# Patient Record
Sex: FEMALE | Race: BLACK OR AFRICAN AMERICAN | HISPANIC OR LATINO | Employment: UNEMPLOYED | URBAN - METROPOLITAN AREA
[De-identification: names, ages, dates, MRNs, and addresses within clinical notes are randomized per-mention and may not be internally consistent; named-entity substitution may affect disease eponyms.]

---

## 2023-08-08 ENCOUNTER — HOSPITAL ENCOUNTER (EMERGENCY)
Facility: CLINIC | Age: 23
Discharge: HOME | End: 2023-08-08
Attending: EMERGENCY MEDICINE | Admitting: EMERGENCY MEDICINE
Payer: COMMERCIAL

## 2023-08-08 VITALS
BODY MASS INDEX: 23.24 KG/M2 | RESPIRATION RATE: 18 BRPM | HEIGHT: 63 IN | DIASTOLIC BLOOD PRESSURE: 89 MMHG | WEIGHT: 131.17 LBS | TEMPERATURE: 97.4 F | OXYGEN SATURATION: 99 % | HEART RATE: 87 BPM | SYSTOLIC BLOOD PRESSURE: 130 MMHG

## 2023-08-08 DIAGNOSIS — M54.9 BACK PAIN, UNSPECIFIED BACK LOCATION, UNSPECIFIED BACK PAIN LATERALITY, UNSPECIFIED CHRONICITY: ICD-10-CM

## 2023-08-08 LAB
ALBUMIN UR-MCNC: NEGATIVE MG/DL
AMORPH CRY #/AREA URNS HPF: ABNORMAL /HPF
APPEARANCE UR: ABNORMAL
BILIRUB UR QL STRIP: NEGATIVE
COLOR UR AUTO: ABNORMAL
GLUCOSE UR STRIP-MCNC: NEGATIVE MG/DL
HCG UR QL: NEGATIVE
HGB UR QL STRIP: NEGATIVE
KETONES UR STRIP-MCNC: NEGATIVE MG/DL
LEUKOCYTE ESTERASE UR QL STRIP: NEGATIVE
NITRATE UR QL: NEGATIVE
PH UR STRIP: 6.5 [PH] (ref 5–7)
RBC URINE: <1 /HPF
SP GR UR STRIP: 1.02 (ref 1–1.03)
SQUAMOUS EPITHELIAL: 2 /HPF
UROBILINOGEN UR STRIP-MCNC: 2 MG/DL
WBC URINE: 2 /HPF

## 2023-08-08 PROCEDURE — 81025 URINE PREGNANCY TEST: CPT | Performed by: EMERGENCY MEDICINE

## 2023-08-08 PROCEDURE — 250N000013 HC RX MED GY IP 250 OP 250 PS 637: Performed by: EMERGENCY MEDICINE

## 2023-08-08 PROCEDURE — 81001 URINALYSIS AUTO W/SCOPE: CPT | Performed by: EMERGENCY MEDICINE

## 2023-08-08 PROCEDURE — 99284 EMERGENCY DEPT VISIT MOD MDM: CPT

## 2023-08-08 RX ORDER — CYCLOBENZAPRINE HCL 5 MG
5 TABLET ORAL 3 TIMES DAILY PRN
Qty: 15 TABLET | Refills: 0 | Status: SHIPPED | OUTPATIENT
Start: 2023-08-08

## 2023-08-08 RX ORDER — LIDOCAINE 4 G/G
1 PATCH TOPICAL EVERY 24 HOURS
Qty: 5 PATCH | Refills: 0 | Status: SHIPPED | OUTPATIENT
Start: 2023-08-08

## 2023-08-08 RX ORDER — CYCLOBENZAPRINE HCL 5 MG
5 TABLET ORAL ONCE
Status: COMPLETED | OUTPATIENT
Start: 2023-08-08 | End: 2023-08-08

## 2023-08-08 RX ORDER — ACETAMINOPHEN 500 MG
1000 TABLET ORAL ONCE
Status: COMPLETED | OUTPATIENT
Start: 2023-08-08 | End: 2023-08-08

## 2023-08-08 RX ORDER — LIDOCAINE 4 G/G
1 PATCH TOPICAL ONCE
Status: DISCONTINUED | OUTPATIENT
Start: 2023-08-08 | End: 2023-08-08 | Stop reason: HOSPADM

## 2023-08-08 RX ADMIN — LIDOCAINE PATCH 4% 1 PATCH: 40 PATCH TOPICAL at 02:28

## 2023-08-08 RX ADMIN — CYCLOBENZAPRINE HYDROCHLORIDE 5 MG: 5 TABLET, FILM COATED ORAL at 04:34

## 2023-08-08 RX ADMIN — ACETAMINOPHEN 1000 MG: 500 TABLET, FILM COATED ORAL at 02:29

## 2023-08-08 ASSESSMENT — ACTIVITIES OF DAILY LIVING (ADL)
ADLS_ACUITY_SCORE: 33
ADLS_ACUITY_SCORE: 33

## 2023-08-08 NOTE — ED TRIAGE NOTES
Pt arrives with lower back pain that got worse today. Pt states she has had back pain for a while but has been getting worse. Pt states no prior trauma or injuries to back. Pt states pain is more noticeable when laughing and coughing. ABCS intact and Aox4.     Triage Assessment       Row Name 08/08/23 0055       Triage Assessment (Adult)    Airway WDL WDL       Respiratory WDL    Respiratory WDL WDL       Cardiac WDL    Cardiac WDL WDL

## 2023-08-08 NOTE — Clinical Note
Beverly Pacheco was seen and treated in our emergency department on 8/8/2023.  She may return to work on 08/11/2023.       If you have any questions or concerns, please don't hesitate to call.      Joycelyn Wade, DO

## 2023-08-08 NOTE — ED PROVIDER NOTES
"    History     Chief Complaint:  Back Pain       HPI   Beverly Pacheco is a 23 year old female who presents with back pain that began two nights ago. The pain worsens with movement and the patient reports being unable to twist very far. She reports the pain being accompanied by lower abdominal discomfort and nausea.   She notes that the pain worsened a lot while she was at work; she notes a history of back pain though intensity tonight was greater prompting concern. She denies medications helping, fever, chills, emesis, dysuria, hematuria, vaginal bleeding/discharge, diarrhea, bladder/bowel incontinence, focal weakness or saddle paresthesias. No history cancer or IVDA.    Independent Historian:    The patient provided the history noted above.    Review of External Notes:  I reviewed the patient's office visit note from 03/30/23.      Medications:    Omnicef  Bentyl  Pepcid  Norco  Robaxin  Zofran  Bactrim    Past Medical History:    Anxiety  Depression  Left breast mass    Physical Exam   Patient Vitals for the past 24 hrs:   BP Temp Temp src Pulse Resp SpO2 Height Weight   08/08/23 0054 -- 97.4  F (36.3  C) Temporal -- -- -- -- --   08/08/23 0053 130/89 -- -- 87 18 99 % 1.6 m (5' 3\") 59.5 kg (131 lb 2.8 oz)      Physical Exam  Vitals reviewed.  General: The patient appears uncomfortable  Head:  The scalp, face, and head appear normal  Eyes:  The pupils are equal and round    Conjunctivae and sclerae are normal  ENT:    Nose normal. Moist mucous membranes  Neck:  Normal range of motion  CV:  Regular rate and underlying rhythm     Normal S1 and S2    DP/PT pulses 2+ bilaterally  Resp:  Lungs are clear    Non-labored breathing    No rales    No wheezing   GI:  Abdomen is soft, there is no rigidity    No distension    No rebound tenderness     No abdominal tenderness    No guarding; no CVA tenderness  MS:  Back:    The cervical and thoracic spine is non-tender in the midline    The lumbar spine is non-tender in " the midline    There is mild lumbar paraspinous muscular pain to palpation    Legs:    There is normal motor strength:     Iliopsoas, quadriceps, hamstrings, tibialis anterior, gastrocnemius, EHL    Sensation intact L2-S1 on bilateral lower extremities    Patellar reflexes are normal    There is normal capillary refill to the toes  Skin:  No rash or acute skin lesions noted.  No shingles noted.  Neuro:  Speech is normal and fluent    Awake and alert    Gait stable      Emergency Department Course     Laboratory:  Labs Ordered and Resulted from Time of ED Arrival to Time of ED Departure   ROUTINE UA WITH MICROSCOPIC - Abnormal       Result Value    Color Urine Light Yellow      Appearance Urine Slightly Cloudy (*)     Glucose Urine Negative      Bilirubin Urine Negative      Ketones Urine Negative      Specific Gravity Urine 1.018      Blood Urine Negative      pH Urine 6.5      Protein Albumin Urine Negative      Urobilinogen Urine 2.0      Nitrite Urine Negative      Leukocyte Esterase Urine Negative      Amorphous Crystals Urine Few (*)     RBC Urine <1      WBC Urine 2      Squamous Epithelials Urine 2 (*)    HCG QUALITATIVE URINE - Normal    hCG Urine Qualitative Negative          Emergency Department Course & Assessments:       Interventions:  Medications   Lidocaine (LIDOCARE) 4 % Patch 1 patch (1 patch Transdermal $Patch/Med Applied 8/8/23 0228)   acetaminophen (TYLENOL) tablet 1,000 mg (1,000 mg Oral $Given 8/8/23 0229)   cyclobenzaprine (FLEXERIL) tablet 5 mg (5 mg Oral $Given 8/8/23 0434)      Assessments:  0333 I obtained history and examined the patient as noted above.   0523 I rechecked and updated the patient. I deemed the patient safe to discharge home.    Independent Interpretation (X-rays, CTs, rhythm strip):  None    Consultations/Discussion of Management or Tests:  None       Social Determinants of Health affecting care:  Stress/Adjustment Disorders     Disposition:  The patient was discharged to  home.     Impression & Plan    CMS Diagnoses: None    Medical Decision Making:  Patient is a 23-year-old female presenting with predominately complaints of back pain.  She is nontoxic on arrival, in no significant distress.  I do not feel emergent blood work is warranted at this point in time.  UA obtained and without evidence to suggest underlying infection.  She is not pregnant today.  Clinically I doubt intra-abdominal source to explain her presentation.  Moreover I doubt pelvic etiology to explain her pain including but not limited to PID/ovarian torsion.  Stronger suspicion that pain is more musculoskeletal in nature particularly as it worsens with movement.  There is no clinical evidence to suggest cauda equina, discitis, transverse myelitis, epidural hematoma.  She is neurologically intact.  No red flag symptoms.  Patient did report some symptom improvement after analgesia provided.  She has allergies to ibuprofen unfortunately though I will initiate muscle relaxant in addition to lidocaine patches for symptom relief.  Avoid heavy bending/lifting/twisting.  Also provided spine referral on discharge as patient does report a longstanding history of back pain.  Gentle range of motion exercises discussed.  Return precautions given.    Diagnosis:    ICD-10-CM    1. Back pain, unspecified back location, unspecified back pain laterality, unspecified chronicity  M54.9            Discharge Medications:  Discharge Medication List as of 8/8/2023  5:24 AM        START taking these medications    Details   cyclobenzaprine (FLEXERIL) 5 MG tablet Take 1 tablet (5 mg) by mouth 3 times daily as needed for muscle spasms, Disp-15 tablet, R-0, Local Print      Lidocaine (LIDOCARE) 4 % Patch Place 1 patch onto the skin every 24 hours To prevent lidocaine toxicity, patient should be patch free for 12 hrs daily.Disp-5 patch, R-0Local Print            Scribe Disclosure:  Davian WERNER, am serving as a scribe at 3:20 AM on 8/8/2023  to document services personally performed by Joycelyn Wade,  based on my observations and the provider's statements to me.               Joycelyn Wade,   08/08/23 0573

## 2023-08-22 ENCOUNTER — OFFICE VISIT (OUTPATIENT)
Dept: NEUROSURGERY | Facility: CLINIC | Age: 23
End: 2023-08-22
Attending: PHYSICIAN ASSISTANT
Payer: COMMERCIAL

## 2023-08-22 VITALS — SYSTOLIC BLOOD PRESSURE: 117 MMHG | HEART RATE: 82 BPM | DIASTOLIC BLOOD PRESSURE: 74 MMHG | OXYGEN SATURATION: 95 %

## 2023-08-22 DIAGNOSIS — R52 GENERALIZED BODY ACHES: Primary | ICD-10-CM

## 2023-08-22 PROCEDURE — G0463 HOSPITAL OUTPT CLINIC VISIT: HCPCS | Performed by: PHYSICIAN ASSISTANT

## 2023-08-22 PROCEDURE — 99203 OFFICE O/P NEW LOW 30 MIN: CPT | Performed by: PHYSICIAN ASSISTANT

## 2023-08-22 ASSESSMENT — PAIN SCALES - GENERAL: PAINLEVEL: SEVERE PAIN (6)

## 2023-08-22 NOTE — LETTER
"    8/22/2023         RE: Beverly Pacheco  [address]  [facility] [address]        Dear Colleague,    Thank you for referring your patient, Beverly Pacheco, to the [facility] [place]. Please see a copy of my visit note below.    NEUROSURGERY CLINIC CONSULT NOTE     DATE OF VISIT: 8/22/2023     SUBJECTIVE:     Beverly Pacheco is a pleasant 23 year old female who presents to the clinic today for consultation on generalized, diffuse body aches that affect her from her shoulders to knees.     Today, she reports a multi-year history of symptoms with a two-week exacerbation. She describes a constant, sharp, aching, burning pain and tightness. This pain is not accompanied by paresthesia, numbness or perceived weakness but she does experience \"warmness\" on the lateral aspect of her legs. At times she has paresthesia in her feet with prolonged sitting. Prolonged walking, standing and sitting aggravate the symptoms, while alleviation is obtained by lying supine. No mechanism of injury such as trauma or a fall is associated with the onset of the pain, although she does state that it seems to be exacerbated at work. There are no bowel or bladder changes. She denies saddle anesthesia. She denies changes in gait, instability, or falling episodes. There has been no significant change in her handwriting or hand dexterity.   She has not participated in conservative therapies.          Current Outpatient Medications:      cyclobenzaprine (FLEXERIL) 5 MG tablet, Take 1 tablet (5 mg) by mouth 3 times daily as needed for muscle spasms, Disp: 15 tablet, Rfl: 0     Lidocaine (LIDOCARE) 4 % Patch, Place 1 patch onto the skin every 24 hours To prevent lidocaine toxicity, patient should be patch free for 12 hrs daily., Disp: 5 patch, Rfl: 0     Allergies   Allergen Reactions     Ibuprofen         No past medical history on file.     ROS: 10 point review of symptoms are negative other " than the symptoms noted above in the HPI.     Family History has been reviewed with the patient, there are no pertinent findings to presenting concern.     No past surgical history on file.           OBJECTIVE:   /74   Pulse 82   SpO2 95%    There is no height or weight on file to calculate BMI.     Imaging:     Normal lumbar x-rays dated 2022    Full radiological report in chart. Imaging was reviewed with with patient today.     Exam:     Patient appears comfortable, conversational, and in no apparent distress.   Head: Normocephalic, without obvious abnormality, atraumatic, no facial asymmetry.   Eyes: conjunctivae/corneas clear. PERRL, EOM's intact.   Throat: lips, mucosa, and tongue normal; teeth and gums normal.   Neck: supple, symmetrical, trachea midline, no adenopathy and thyroid: not enlarged, symmetric, no tenderness/mass/nodules.   Lungs: clear to auscultation bilaterally.   Heart: regular rate and rhythm.   Abdomen: soft, non-tender; bowel sounds normal; no masses, no organomegaly.   Pulses: 2+ and symmetric.   Skin: Skin color, texture, turgor normal. No rashes or lesions.     CN II-XII grossly intact, alert and appropriate with conversation and following commands.   Gait is non-antalgic. Able to tandem walk. Able to walk on toes and heels without difficulty.   Cervical spine is non tender to palpation. Appropriate range of motion of neck, not concerning for lhermitte's phenomenon.   Bilateral bicep 2/4 and tricep reflexes 1/4. Sensation intact throughout upper extremities.     UE muscle strength  Right  Left    Deltoid  5/5  5/5    Biceps  5/5  5/5    Triceps  5/5  5/5    Hand intrinsics  5/5  5/5    Hand grasp  5/5  5/5    Youssef signs  neg  neg      Lumbar spine is non tender to palpation.  Intact sensation throughout lower extremities.   Bilateral patellar 2/4 and achilles reflex 1/4. Negative for pain with straight leg raise.     LE muscle strength  Right  Left    Iliopsoas (hip flexion)   "5/5  5/5    Quad (knee extension)  5/5  5/5    Hamstring (knee flexion)  5/5  5/5    Gastrocnemius (PF)  5/5  5/5    Tibialis Ant. (DF)  5/5  5/5    EHL  5/5  5/5      Negative Babinski bilaterally. Negative for clonus.   Calves are soft and non-tender bilaterally.       ASSESSMENT/PLAN:     Beverly Pacheco is a 23 year old female who presents to the clinic for consultation on a multi-year history of symptoms with a two-week exacerbation. She describes a constant, sharp, aching, burning pain and tightness. This pain is not accompanied by paresthesia, numbness or perceived weakness but she does experience \"warmness\" on the lateral aspect of her legs. At times she has paresthesia in her feet with prolonged sitting. The patient's most recent imaging was reviewed with her today. It was explained that images do not show any concerning pathology. On exam, she is noted to have appropriate strength, sensation and range of motion. She has not attempted conservative management.    Based on her physical exam, imaging review, and lack of past treatments, we discussed options such as physical therapy, pain managemetn consult and neurology consult. She would like to start with Neurology to rule out any diagnosis such as MS, fibromyalgia, etc.    I do think physical therapy and pain management should be considered her next step.     We also discussed signs of a worsening problem that she should seek being evaluated.        Respectfully,     LARRY Marcum, LUIS ENRIQUE  [facility] Neurosurgery  [facility]  Tel: [phone]      Exam, imaging, and plan reviewed by Dr. Rodriguez.       Again, thank you for allowing me to participate in the care of your patient.        Sincerely,        Renzo Rodriguez PA-C  "

## 2023-08-22 NOTE — PROGRESS NOTES
"NEUROSURGERY CLINIC CONSULT NOTE     DATE OF VISIT: 8/22/2023     SUBJECTIVE:     Beverly Pacheco is a pleasant 23 year old female who presents to the clinic today for consultation on generalized, diffuse body aches that affect her from her shoulders to knees.     Today, she reports a multi-year history of symptoms with a two-week exacerbation. She describes a constant, sharp, aching, burning pain and tightness. This pain is not accompanied by paresthesia, numbness or perceived weakness but she does experience \"warmness\" on the lateral aspect of her legs. At times she has paresthesia in her feet with prolonged sitting. Prolonged walking, standing and sitting aggravate the symptoms, while alleviation is obtained by lying supine. No mechanism of injury such as trauma or a fall is associated with the onset of the pain, although she does state that it seems to be exacerbated at work. There are no bowel or bladder changes. She denies saddle anesthesia. She denies changes in gait, instability, or falling episodes. There has been no significant change in her handwriting or hand dexterity.   She has not participated in conservative therapies.          Current Outpatient Medications:     cyclobenzaprine (FLEXERIL) 5 MG tablet, Take 1 tablet (5 mg) by mouth 3 times daily as needed for muscle spasms, Disp: 15 tablet, Rfl: 0    Lidocaine (LIDOCARE) 4 % Patch, Place 1 patch onto the skin every 24 hours To prevent lidocaine toxicity, patient should be patch free for 12 hrs daily., Disp: 5 patch, Rfl: 0     Allergies   Allergen Reactions    Ibuprofen         No past medical history on file.     ROS: 10 point review of symptoms are negative other than the symptoms noted above in the HPI.     Family History has been reviewed with the patient, there are no pertinent findings to presenting concern.     No past surgical history on file.           OBJECTIVE:   /74   Pulse 82   SpO2 95%    There is no height or weight on file " to calculate BMI.     Imaging:     Normal lumbar x-rays dated 2022    Full radiological report in chart. Imaging was reviewed with with patient today.     Exam:     Patient appears comfortable, conversational, and in no apparent distress.   Head: Normocephalic, without obvious abnormality, atraumatic, no facial asymmetry.   Eyes: conjunctivae/corneas clear. PERRL, EOM's intact.   Throat: lips, mucosa, and tongue normal; teeth and gums normal.   Neck: supple, symmetrical, trachea midline, no adenopathy and thyroid: not enlarged, symmetric, no tenderness/mass/nodules.   Lungs: clear to auscultation bilaterally.   Heart: regular rate and rhythm.   Abdomen: soft, non-tender; bowel sounds normal; no masses, no organomegaly.   Pulses: 2+ and symmetric.   Skin: Skin color, texture, turgor normal. No rashes or lesions.     CN II-XII grossly intact, alert and appropriate with conversation and following commands.   Gait is non-antalgic. Able to tandem walk. Able to walk on toes and heels without difficulty.   Cervical spine is non tender to palpation. Appropriate range of motion of neck, not concerning for lhermitte's phenomenon.   Bilateral bicep 2/4 and tricep reflexes 1/4. Sensation intact throughout upper extremities.     UE muscle strength  Right  Left    Deltoid  5/5  5/5    Biceps  5/5  5/5    Triceps  5/5  5/5    Hand intrinsics  5/5  5/5    Hand grasp  5/5  5/5    Youssef signs  neg  neg      Lumbar spine is non tender to palpation.  Intact sensation throughout lower extremities.   Bilateral patellar 2/4 and achilles reflex 1/4. Negative for pain with straight leg raise.     LE muscle strength  Right  Left    Iliopsoas (hip flexion)  5/5  5/5    Quad (knee extension)  5/5  5/5    Hamstring (knee flexion)  5/5  5/5    Gastrocnemius (PF)  5/5  5/5    Tibialis Ant. (DF)  5/5  5/5    EHL  5/5  5/5      Negative Babinski bilaterally. Negative for clonus.   Calves are soft and non-tender bilaterally.       ASSESSMENT/PLAN:  "    Beverly Pacheco is a 23 year old female who presents to the clinic for consultation on a multi-year history of symptoms with a two-week exacerbation. She describes a constant, sharp, aching, burning pain and tightness. This pain is not accompanied by paresthesia, numbness or perceived weakness but she does experience \"warmness\" on the lateral aspect of her legs. At times she has paresthesia in her feet with prolonged sitting. The patient's most recent imaging was reviewed with her today. It was explained that images do not show any concerning pathology. On exam, she is noted to have appropriate strength, sensation and range of motion. She has not attempted conservative management.    Based on her physical exam, imaging review, and lack of past treatments, we discussed options such as physical therapy, pain managemetn consult and neurology consult. She would like to start with Neurology to rule out any diagnosis such as MS, fibromyalgia, etc.    I do think physical therapy and pain management should be considered her next step.     We also discussed signs of a worsening problem that she should seek being evaluated.        Respectfully,     LARRY Marcum, PA-JUAN  [facility] Neurosurgery  [facility]  Tel: [phone]      Exam, imaging, and plan reviewed by Dr. Rodriguez.   "

## 2023-08-22 NOTE — NURSING NOTE
"Beverly Pacheco is a 23 year old female who presents for:  Chief Complaint   Patient presents with    Hospital F/U     Back pain        Vitals:    Vitals:    08/22/23 1403   BP: 117/74   Pulse: 82   SpO2: 95%       BMI:  Estimated body mass index is 23.24 kg/m  as calculated from the following:    Height as of 8/8/23: 5' 3\" (1.6 m).    Weight as of 8/8/23: 131 lb 2.8 oz (59.5 kg).    Pain Score:  Severe Pain (6)        Amendo Phorn      "

## 2023-08-23 ENCOUNTER — TELEPHONE (OUTPATIENT)
Dept: NEUROLOGY | Facility: CLINIC | Age: 23
End: 2023-08-23

## 2023-08-23 NOTE — PROGRESS NOTES
INITIAL NEUROLOGY CONSULTATION    DATE OF VISIT: 8/24/2023  CLINIC LOCATION: [facility]  MRN: [id number]  PATIENT NAME: Beverly Pacheco  YOB: 2000    REASON FOR VISIT: No chief complaint on file.    HISTORY OF PRESENT ILLNESS:                                                    Ms. Beverly Pacheco is 23 year old right handed female patient with past medical history, who was seen today for diffuse body aches.    Per patient's report, symptoms started several years ago and usually occur with up to 2 weeks exacerbations every ***.  The pain is constant, sharp, aching and burning along with tightness.  No associated muscle weakness or paresthesias.  No other focal neurological symptoms, prior history of significant head injuries, seizures, or CNS infections.    Laboratory evaluation from August 2023 includes unremarkable urinalysis and hCG.    No additional useful information is available in Care Everywhere, which was reviewed.  PAST MEDICAL/SURGICAL HISTORY:                                                    I personally reviewed patient's past medical and surgical history with the patient at today's visit.  MEDICATIONS:                                                    I personally reviewed patient's medications and allergies with the patient at today's visit.  ALLERGIES:                                                      Allergies   Allergen Reactions    Ibuprofen      EXAM:                                                    VITAL SIGNS:   There were no vitals taken for this visit.  Mini-Cog Assessment:       General: pt is in NAD, cooperative.  Skin: normal turgor, moist mucous membranes, no lesions/rashes noticed.  HEENT: ATNC, EOMI, PERRL, white sclera, normal conjunctiva, no nystagmus or ptosis. No carotid bruits bilaterally.  Respiratory: lung sounds clear to auscultation bilaterally, no crackles, wheezes, rhonchi. Symmetric lung excursion, no accessory respiratory  muscle use.  Cardiovascular: normal S1/S2, no murmurs/rubs/gallops.   Abdomen: Not distended.  : deferred.    Neurological:  Mental: alert, follows commands,  /5 with ***/3 on memory recall, no aphasia or dysarthria. Fund of knowledge is {MYAPPROPRIATE:[id number]}  Cranial Nerves:  CN II: visual acuity - able to accurately count fingers with each eye. Visual fields intact, fundi: discs sharp, no papilledema and normal vessels bilaterally.  CN III, IV, VI: EOM intact, pupils equal and reactive  CN V: facial sensation nl  CN VII: face symmetric, no facial droop  CN VIII: hearing normal  CN IX: palate elevation symmetric, uvula at midline  CN XI SCM normal, shoulder shrug nl  CN XII: tongue midline  Motor: Strength: 5/5 in all major groups of all extremities. Normal tone. No abnormal movements. No pronator drift b/l.  Reflexes: Triceps, biceps, brachioradialis, patellar, and achilles reflexes normal and symmetric. No clonus noted. Toes are down-going b/l.   Sensory: light touch, pinprick, and vibration intact. Romberg: negative.  Coordination: FNF and heel-shin tests intact b/l.   Gait:  Normal, able to tandem walk *** without difficulty.  DATA:   LABS/EEG/IMAGING/OTHER STUDIES: I reviewed pertinent medical records, as detailed in the history of present illness.  ASSESSMENT AND PLAN:      ASSESSMENT: Beverly Pacheco is a 23 year old female patient with listed above past medical history, who presents with ***.    We had a detailed discussion with the patient regarding her presenting complaints.  The neurological exam today is ***.    DIAGNOSES:  No diagnosis found.  PLAN: There are no Patient Instructions on file for this visit.    Total Time: *** minutes spent on the date of the encounter doing chart review, history and exam, documentation and further activities per the note.    Zain Archuleta MD  [facility] Neurology  (Chart documentation was completed in part with Dragon voice-recognition software.  Even though reviewed, some grammatical, spelling, and word errors may remain.)

## 2023-08-24 ENCOUNTER — OFFICE VISIT (OUTPATIENT)
Dept: NEUROLOGY | Facility: CLINIC | Age: 23
End: 2023-08-24
Attending: PHYSICIAN ASSISTANT
Payer: COMMERCIAL

## 2023-08-24 VITALS — HEART RATE: 71 BPM | SYSTOLIC BLOOD PRESSURE: 110 MMHG | DIASTOLIC BLOOD PRESSURE: 67 MMHG | OXYGEN SATURATION: 98 %

## 2023-08-24 DIAGNOSIS — M79.601 PARESTHESIA AND PAIN OF BOTH UPPER EXTREMITIES: Primary | ICD-10-CM

## 2023-08-24 DIAGNOSIS — R20.2 PARESTHESIA AND PAIN OF BOTH UPPER EXTREMITIES: Primary | ICD-10-CM

## 2023-08-24 DIAGNOSIS — G89.29 CHRONIC BILATERAL LOW BACK PAIN WITHOUT SCIATICA: ICD-10-CM

## 2023-08-24 DIAGNOSIS — M54.2 CERVICALGIA: ICD-10-CM

## 2023-08-24 DIAGNOSIS — M79.602 PARESTHESIA AND PAIN OF BOTH UPPER EXTREMITIES: Primary | ICD-10-CM

## 2023-08-24 DIAGNOSIS — M54.9 UPPER BACK PAIN: ICD-10-CM

## 2023-08-24 DIAGNOSIS — M54.50 CHRONIC BILATERAL LOW BACK PAIN WITHOUT SCIATICA: ICD-10-CM

## 2023-08-24 PROCEDURE — 99205 OFFICE O/P NEW HI 60 MIN: CPT | Performed by: PSYCHIATRY & NEUROLOGY

## 2023-08-24 NOTE — PATIENT INSTRUCTIONS
AFTER VISIT SUMMARY (AVS):    At today's visit we thoroughly discussed various diagnostic possibilities for your symptoms, necessary evaluation, and the plan, which includes:  Orders Placed This Encounter   Procedures    Physical Therapy Referral    EMG     No new medications.     Additional recommendations after the work-up.    Next follow-up appointment is in the next 6-8 weeks or earlier if needed.    Please do not hesitate to call me with any questions or concerns.    Thanks.

## 2023-08-24 NOTE — PROGRESS NOTES
"INITIAL NEUROLOGY CONSULTATION    DATE OF VISIT: 8/24/2023  CLINIC LOCATION: [facility]  MRN: [id number]  PATIENT NAME: Beverly Pacheco  YOB: 2000    REASON FOR VISIT:   Chief Complaint   Patient presents with    Consult     Generalized muscle tightness ands pain      HISTORY OF PRESENT ILLNESS:                                                    Ms. Beverly Pacheco is 23 year old right handed female patient with past medical history of anxiety and depression, who was seen today for bilateral limb paresthesias and diffuse spine pain radiating to all extremities.    Per patient's report, symptoms started in 2017, mainly low back pain, but worsened over the last 2 to 3 weeks.  At the present time, she experiences worsening of her chronic back pain that spreads to her legs/thighs and neck/upper thoracic pain that spreads to her shoulders.  Both locations feel tight.  Hands and feet tend to fall asleep causing discomfort.  Feet paresthesia occurs after prolonged sitting or prolonged walking.  Upper extremity paresthesias, mainly over the ulnar surfaces, happen after overnight sleep.  No associated muscle weakness or other focal neurological symptoms.    Walking, standing, and lifting worsens her pain.  \"Cracking\" her back brings temporary relief from the pressure.  Applying heat or ice does not help.  Flexeril was marginally helpful.    No other focal neurological symptoms, prior history of significant head injuries, seizures, or CNS infections.  Family history is positive for seizures.    Laboratory evaluation from August 2023 includes unremarkable urinalysis and hCG.    No additional useful information is available in Care Everywhere, which was reviewed.  PAST MEDICAL/SURGICAL HISTORY:                                                    I personally reviewed patient's past medical and surgical history with the patient at today's visit.  MEDICATIONS:                              "                       I personally reviewed patient's medications and allergies with the patient at today's visit.  ALLERGIES:                                                      Allergies   Allergen Reactions    Ibuprofen      EXAM:                                                    VITAL SIGNS:   /67 (BP Location: Right arm, Patient Position: Sitting, Cuff Size: Adult Regular)   Pulse 71   SpO2 98%   Mini-Cog Assessment:  Mini Cog Assessment  Clock Draw Score: 2 Normal  3 Item Recall: 3 objects recalled  Mini Cog Total Score: 5  Administered by: : Shakira HERNANDEZ    General: pt is in NAD, cooperative.  Skin: normal turgor, moist mucous membranes, no lesions/rashes noticed.  HEENT: ATNC, EOMI, PERRL, white sclera, normal conjunctiva, no nystagmus or ptosis. No carotid bruits bilaterally.  Respiratory: lung sounds clear to auscultation bilaterally, no crackles, wheezes, rhonchi. Symmetric lung excursion, no accessory respiratory muscle use.  Cardiovascular: normal S1/S2, no murmurs/rubs/gallops.   Abdomen: Not distended.  : deferred.    Neurological:  Mental: alert, follows commands, Mini Cog Total Score: 5/5 with 3/3 on memory recall, no aphasia or dysarthria. Fund of knowledge is appropriate for age.  Cranial Nerves:  CN II: visual acuity - able to accurately count fingers with each eye. Visual fields intact, fundi: discs sharp, no papilledema and normal vessels bilaterally.  CN III, IV, VI: EOM intact, pupils equal and reactive  CN V: facial sensation nl  CN VII: face symmetric, no facial droop  CN VIII: hearing normal  CN IX: palate elevation symmetric, uvula at midline  CN XI SCM normal, shoulder shrug nl  CN XII: tongue midline  Motor: Strength: 5/5 in all major groups of all extremities. Normal tone. No abnormal movements. No pronator drift b/l.  Reflexes: Triceps, biceps, brachioradialis, patellar, and achilles reflexes normal and symmetric. No clonus noted. Toes are down-going b/l.   Sensory: light  touch/pinprick sensation is reduced on the ulnar surfaces of both hands and intact elsewhere, vibration intact. Romberg: negative.  Coordination: FNF and heel-shin tests intact b/l.   Gait:  Normal, able to tandem walk without difficulty.  DATA:   LABS/EEG/IMAGING/OTHER STUDIES: I reviewed pertinent medical records, as detailed in the history of present illness.  ASSESSMENT AND PLAN:      ASSESSMENT: Beverly Pacheco is a 23 year old female patient with listed above past medical history, who presents with diffuse spine pain, that radiates into her shoulders and thighs/lower extremities, along with limb paresthesias.    We had a detailed discussion with the patient regarding her presenting complaints.  The neurological exam today is noticeable for reduced pinprick sensation in the distribution of both ulnar nerves.  We discussed that the differential includes bilateral ulnar neuropathy versus cervical radiculopathy (less likely).  For diagnosis clarification, we decided to do EMG.    Regarding diffuse spine pain, I suspect musculoskeletal etiology.  I do not believe that we need to pursue spine imaging at this point, but I placed an order for physical therapy to see if it helps.  Might consider imaging if symptoms persist.    DIAGNOSES:    ICD-10-CM    1. Paresthesia and pain of both upper extremities  R20.2 Adult Neurology  Referral    M79.601 EMG    M79.602       2. Chronic bilateral low back pain without sciatica  M54.50 Physical Therapy Referral    G89.29       3. Cervicalgia  M54.2 Physical Therapy Referral      4. Upper back pain  M54.9 Physical Therapy Referral        PLAN: At today's visit we thoroughly discussed various diagnostic possibilities for patient's symptoms, necessary evaluation, and the plan, which includes:  Orders Placed This Encounter   Procedures    Physical Therapy Referral    EMG     No new medications.     Additional recommendations after the work-up.    Next follow-up  appointment is in the next 6-8 weeks or earlier if needed.    Total Time: 61 minutes spent on the date of the encounter doing chart review, history and exam, documentation and further activities per the note.    Zain Archuleta MD  [facility] Neurology  (Chart documentation was completed in part with Dragon voice-recognition software. Even though reviewed, some grammatical, spelling, and word errors may remain.)

## 2023-08-24 NOTE — LETTER
"    8/24/2023         RE: Beverly Pacheco  [address]  [facility] [address]        Dear Colleague,    Thank you for referring your patient, Beverly Pacheco, to the [facility] NEUROLOGY CLINICS [facility]. Please see a copy of my visit note below.    INITIAL NEUROLOGY CONSULTATION    DATE OF VISIT: 8/24/2023  CLINIC LOCATION: [facility]  MRN: [id number]  PATIENT NAME: Beverly Pacheco  YOB: 2000    REASON FOR VISIT:   Chief Complaint   Patient presents with     Consult     Generalized muscle tightness ands pain      HISTORY OF PRESENT ILLNESS:                                                    Ms. Beverly Pacheco is 23 year old right handed female patient with past medical history of anxiety and depression, who was seen today for bilateral limb paresthesias and diffuse spine pain radiating to all extremities.    Per patient's report, symptoms started in 2017, mainly low back pain, but worsened over the last 2 to 3 weeks.  At the present time, she experiences worsening of her chronic back pain that spreads to her legs/thighs and neck/upper thoracic pain that spreads to her shoulders.  Both locations feel tight.  Hands and feet tend to fall asleep causing discomfort.  Feet paresthesia occurs after prolonged sitting or prolonged walking.  Upper extremity paresthesias, mainly over the ulnar surfaces, happen after overnight sleep.  No associated muscle weakness or other focal neurological symptoms.    Walking, standing, and lifting worsens her pain.  \"Cracking\" her back brings temporary relief from the pressure.  Applying heat or ice does not help.  Flexeril was marginally helpful.    No other focal neurological symptoms, prior history of significant head injuries, seizures, or CNS infections.  Family history is positive for seizures.    Laboratory evaluation from August 2023 includes unremarkable urinalysis and hCG.    No additional useful information is " available in Care Everywhere, which was reviewed.  PAST MEDICAL/SURGICAL HISTORY:                                                    I personally reviewed patient's past medical and surgical history with the patient at today's visit.  MEDICATIONS:                                                    I personally reviewed patient's medications and allergies with the patient at today's visit.  ALLERGIES:                                                      Allergies   Allergen Reactions     Ibuprofen      EXAM:                                                    VITAL SIGNS:   /67 (BP Location: Right arm, Patient Position: Sitting, Cuff Size: Adult Regular)   Pulse 71   SpO2 98%   Mini-Cog Assessment:  Mini Cog Assessment  Clock Draw Score: 2 Normal  3 Item Recall: 3 objects recalled  Mini Cog Total Score: 5  Administered by: : Shakira HERNANDEZ    General: pt is in NAD, cooperative.  Skin: normal turgor, moist mucous membranes, no lesions/rashes noticed.  HEENT: ATNC, EOMI, PERRL, white sclera, normal conjunctiva, no nystagmus or ptosis. No carotid bruits bilaterally.  Respiratory: lung sounds clear to auscultation bilaterally, no crackles, wheezes, rhonchi. Symmetric lung excursion, no accessory respiratory muscle use.  Cardiovascular: normal S1/S2, no murmurs/rubs/gallops.   Abdomen: Not distended.  : deferred.    Neurological:  Mental: alert, follows commands, Mini Cog Total Score: 5/5 with 3/3 on memory recall, no aphasia or dysarthria. Fund of knowledge is appropriate for age.  Cranial Nerves:  CN II: visual acuity - able to accurately count fingers with each eye. Visual fields intact, fundi: discs sharp, no papilledema and normal vessels bilaterally.  CN III, IV, VI: EOM intact, pupils equal and reactive  CN V: facial sensation nl  CN VII: face symmetric, no facial droop  CN VIII: hearing normal  CN IX: palate elevation symmetric, uvula at midline  CN XI SCM normal, shoulder shrug nl  CN XII: tongue  midline  Motor: Strength: 5/5 in all major groups of all extremities. Normal tone. No abnormal movements. No pronator drift b/l.  Reflexes: Triceps, biceps, brachioradialis, patellar, and achilles reflexes normal and symmetric. No clonus noted. Toes are down-going b/l.   Sensory: light touch/pinprick sensation is reduced on the ulnar surfaces of both hands and intact elsewhere, vibration intact. Romberg: negative.  Coordination: FNF and heel-shin tests intact b/l.   Gait:  Normal, able to tandem walk without difficulty.  DATA:   LABS/EEG/IMAGING/OTHER STUDIES: I reviewed pertinent medical records, as detailed in the history of present illness.  ASSESSMENT AND PLAN:      ASSESSMENT: Beverly Pacheco is a 23 year old female patient with listed above past medical history, who presents with diffuse spine pain, that radiates into her shoulders and thighs/lower extremities, along with limb paresthesias.    We had a detailed discussion with the patient regarding her presenting complaints.  The neurological exam today is noticeable for reduced pinprick sensation in the distribution of both ulnar nerves.  We discussed that the differential includes bilateral ulnar neuropathy versus cervical radiculopathy (less likely).  For diagnosis clarification, we decided to do EMG.    Regarding diffuse spine pain, I suspect musculoskeletal etiology.  I do not believe that we need to pursue spine imaging at this point, but I placed an order for physical therapy to see if it helps.  Might consider imaging if symptoms persist.    DIAGNOSES:    ICD-10-CM    1. Paresthesia and pain of both upper extremities  R20.2 Adult Neurology  Referral    M79.601 EMG    M79.602       2. Chronic bilateral low back pain without sciatica  M54.50 Physical Therapy Referral    G89.29       3. Cervicalgia  M54.2 Physical Therapy Referral      4. Upper back pain  M54.9 Physical Therapy Referral        PLAN: At today's visit we thoroughly discussed  various diagnostic possibilities for patient's symptoms, necessary evaluation, and the plan, which includes:  Orders Placed This Encounter   Procedures     Physical Therapy Referral     EMG     No new medications.     Additional recommendations after the work-up.    Next follow-up appointment is in the next 6-8 weeks or earlier if needed.    Total Time: 61 minutes spent on the date of the encounter doing chart review, history and exam, documentation and further activities per the note.    Zain Archuleta MD  [facility] Neurology  (Chart documentation was completed in part with Dragon voice-recognition software. Even though reviewed, some grammatical, spelling, and word errors may remain.)          Again, thank you for allowing me to participate in the care of your patient.        Sincerely,        Zain Archuleta MD

## 2023-11-06 ENCOUNTER — TELEPHONE (OUTPATIENT)
Dept: NEUROLOGY | Facility: CLINIC | Age: 23
End: 2023-11-06

## 2023-11-07 ENCOUNTER — OFFICE VISIT (OUTPATIENT)
Dept: NEUROLOGY | Facility: CLINIC | Age: 23
End: 2023-11-07

## 2023-11-07 DIAGNOSIS — M79.601 PARESTHESIA AND PAIN OF BOTH UPPER EXTREMITIES: ICD-10-CM

## 2023-11-07 DIAGNOSIS — M79.602 PARESTHESIA AND PAIN OF BOTH UPPER EXTREMITIES: ICD-10-CM

## 2023-11-07 DIAGNOSIS — R20.2 PARESTHESIA AND PAIN OF BOTH UPPER EXTREMITIES: ICD-10-CM

## 2023-11-07 PROCEDURE — 95911 NRV CNDJ TEST 9-10 STUDIES: CPT | Performed by: PSYCHIATRY & NEUROLOGY

## 2023-11-07 PROCEDURE — 95886 MUSC TEST DONE W/N TEST COMP: CPT | Mod: 50 | Performed by: PSYCHIATRY & NEUROLOGY

## 2023-11-07 NOTE — LETTER
2023         RE: Beverly Pacheco  [address]  [facility] [address]        Dear Colleague,    Thank you for referring your patient, Beverly Pacheco, to the [facility] NEUROLOGY CLINICS [facility]. Please see a copy of my visit note below.                            [facility]  Electrodiagnostic Laboratory                 Department of Neurology                                                                                                         Test Date:  2023    Patient: Beverly Pacheco : 2000 Physician: Carroll Ye MD   Sex: Female AGE: 23 year Ref Phys: Zain Archuleta MD   ID#: [id number]   Technician: Kristy Behling     History and Examination:    23-year-old woman with chief complaint of chronic spine pain, stiffness and pain at her neck and shoulders, and intermittent paresthesias of both hands, mostly when asleep or when waking up in the morning.  Initially she reported more intense paresthesia at digits 4 and 5 of both hands, but at times it also affects the thenar and digits 1 or 2.  EMG was requested to evaluate for entrapment neuropathies of bilateral upper extremities, versus cervical radiculopathies.    Techniques:    Motor and sensory conduction studies were done with surface recording electrodes. EMG was done with a concentric needle electrode.     Results:    Bilateral median (APB), and ulnar (ADM) motor nerve conduction studies were normal.  Bilateral median, ulnar, and the left radial antidromic sensory nerve conduction studies were normal.  Bilateral median and ulnar orthodromic (palmar) mixed nerve conduction studies were normal. Left ulnar F wave latencies were normal.    Needle EMG of the right APB showed increased insertional activity but no sustained abnormal spontaneous activity.  MUP morphology and recruitment patterns were normal.  Needle EMG of bilateral FDI, EDC, triceps, deltoids, pronator teres, and the left  APB, were normal.    Interpretation:    Normal study.  No electrodiagnostic evidence of carpal tunnel syndrome, ulnar neuropathy, or cervical radiculopathy, on either side.    ___________________________  Carroll Ye MD        Nerve Conduction Studies  Motor Sites      Latency Amplitude Neg. Amp Diff Segment Distance Velocity Neg. Dur Neg Area Diff Temperature Comment   Site (ms) Norm (mV) Norm %  cm m/s Norm ms %  C    Left Median (APB) Motor   Wrist 3.0  < 4.4 12.1  > 5.0  Wrist-APB 8   6.0  -    Elbow 6.7 - 12.2  > 5.0 0.83 Elbow-Wrist 21 57  > 48 6.0 0.46 -    Right Median (APB) Motor   Wrist 3.5  < 4.4 12.7  > 5.0  Wrist-APB 8   5.4  -    Elbow 7.3 - 12.1  > 5.0 -4.7 Elbow-Wrist 21 55  > 48 5.4 -3.4 -    Left Ulnar (ADM) Motor   Wrist 2.9  < 3.5 11.1  > 5.0  Wrist-ADM 8   4.9  -    Bel Elbow 6.4 - 11.1 - 0 Bel Elbow-Wrist 19 54  > 48 4.9 -5.4 -    Abv Elbow 8.2 - 11.0 - -0.90 Abv Elbow-Bel Elbow 9 50  > 48 5.5 -3.7 -    Right Ulnar (ADM) Motor   Wrist 3.0  < 3.5 12.2  > 5.0  Wrist-ADM 8   5.8  -    Bel Elbow 6.7 - 10.7 - -12.3 Bel Elbow-Wrist 20 54  > 48 5.5 -6.3 -    Abv Elbow 8.5 - 10.9 - 1.87 Abv Elbow-Bel Elbow 9 50  > 48 6.3 -1.60 -      Sensory Sites      Onset Lat Peak Lat Amp (O-P) Amp (P-P) Segment Distance Velocity Temperature Comment   Site ms ms  V Norm  V  cm m/s Norm  C    Left Median Sensory   Wrist-Dig II 2.4 3.1 49  > 10 79 Wrist-Dig II 14 58  > 48 -    Right Median Sensory   Wrist-Dig II 2.1 2.9 78  > 10 80 Wrist-Dig II 14 67  > 48 -    Left Median-Ulnar Palmar Sensory        Median   Palm-Wrist 1.35 1.80 27 - 38 Palm-Wrist 8 59 - -         Ulnar   Palm-Wrist 1.48 2.0 28 - 34 Palm-Wrist 8 54 - -    Right Median-Ulnar Palmar Sensory        Median   Palm-Wrist 1.30 1.78 57 - 83 Palm-Wrist 8 62 - -         Ulnar   Palm-Wrist 1.35 1.90 30 - 39 Palm-Wrist 8 59 - -    Left Radial Sensory   Forearm-Wrist 1.65 2.2 56  > 15 80 Forearm-Wrist 10 61 - -    Left Ulnar Sensory   Wrist-Dig V 2.3  3.1 35  > 8 89 Wrist-Dig V 12.5 54  > 48 -    Right Ulnar Sensory   Wrist-Dig V 2.1 2.9 42  > 8 39 Wrist-Dig V 12.5 60  > 48 -      Inter-Nerve Comparisons     Nerve 1 Value 1 Nerve 2 Value 2 Parameter Result Normal   Sensory Sites   R Median Palm-Wrist 1.8 ms R Ulnar Palm-Wrist 1.9 ms Peak Lat Diff 0.12 ms <0.40   L Median Palm-Wrist 1.8 ms L Ulnar Palm-Wrist 2.0 ms Peak Lat Diff 0.20 ms <0.40     F Wave Studies     Min-F Max-F Dispersion Persistence Mean-F F-Norm L-R Mean-F L-R Mean-F Norm F/M Ratio F-M Lat (ms)   Left Ulnar (Abd Dig Min)   26.48 29.14 2.66 100.00 27.52 <36  <2.5 0.77 23.28       Electromyography     Side Muscle Ins Act Fibs/PSW Fasc HF Amp Dur Poly Recrt Int Pat   Right FDI Nml None Nml 0 Nml Nml 0 Nml Nml   Right Triceps Nml None Nml 0 Nml Nml 0 Nml Nml   Right Deltoid Nml None Nml 0 Nml Nml 0 Nml Nml   Right Pronator Teres Nml None Nml 0 Nml Nml 0 Nml Nml   Right EDC Nml None Nml 0 Nml Nml 0 Nml Nml   Right APB Incr None Nml 0 Nml Nml 0 Nml Nml   Left FDI Nml None Nml 0 Nml Nml 0 Nml Nml   Left EDC Nml None Nml 0 Nml Nml 0 Nml Nml   Left Triceps Nml None Nml 0 Nml Nml 0 Nml Nml   Left Deltoid Nml None Nml 0 Nml Nml 0 Nml Nml   Left Pronator Teres Nml None Nml 0 Nml Nml 0 Nml Nml   Left APB Nml None Nml 0 Nml Nml 0 Nml Nml         NCS Waveforms:    Motor                Sensory                            Ultrasound Images:                Again, thank you for allowing me to participate in the care of your patient.        Sincerely,        Craroll Ye MD

## 2023-11-07 NOTE — PROGRESS NOTES
[facility]  Electrodiagnostic Laboratory                 Department of Neurology                                                                                                         Test Date:  2023    Patient: Beverly Pacheco : 2000 Physician: Carroll Ye MD   Sex: Female AGE: 23 year Ref Phys: Zain Archuleta MD   ID#: [id number]   Technician: Kristy Behling     History and Examination:    23-year-old woman with chief complaint of chronic spine pain, stiffness and pain at her neck and shoulders, and intermittent paresthesias of both hands, mostly when asleep or when waking up in the morning.  Initially she reported more intense paresthesia at digits 4 and 5 of both hands, but at times it also affects the thenar and digits 1 or 2.  EMG was requested to evaluate for entrapment neuropathies of bilateral upper extremities, versus cervical radiculopathies.    Techniques:    Motor and sensory conduction studies were done with surface recording electrodes. EMG was done with a concentric needle electrode.     Results:    Bilateral median (APB), and ulnar (ADM) motor nerve conduction studies were normal.  Bilateral median, ulnar, and the left radial antidromic sensory nerve conduction studies were normal.  Bilateral median and ulnar orthodromic (palmar) mixed nerve conduction studies were normal. Left ulnar F wave latencies were normal.    Needle EMG of the right APB showed increased insertional activity but no sustained abnormal spontaneous activity.  MUP morphology and recruitment patterns were normal.  Needle EMG of bilateral FDI, EDC, triceps, deltoids, pronator teres, and the left APB, were normal.    Interpretation:    Normal study.  No electrodiagnostic evidence of carpal tunnel syndrome, ulnar neuropathy, or cervical radiculopathy, on either side.    ___________________________  Carroll Ye MD        Nerve Conduction Studies  Motor  Sites      Latency Amplitude Neg. Amp Diff Segment Distance Velocity Neg. Dur Neg Area Diff Temperature Comment   Site (ms) Norm (mV) Norm %  cm m/s Norm ms %  C    Left Median (APB) Motor   Wrist 3.0  < 4.4 12.1  > 5.0  Wrist-APB 8   6.0  -    Elbow 6.7 - 12.2  > 5.0 0.83 Elbow-Wrist 21 57  > 48 6.0 0.46 -    Right Median (APB) Motor   Wrist 3.5  < 4.4 12.7  > 5.0  Wrist-APB 8   5.4  -    Elbow 7.3 - 12.1  > 5.0 -4.7 Elbow-Wrist 21 55  > 48 5.4 -3.4 -    Left Ulnar (ADM) Motor   Wrist 2.9  < 3.5 11.1  > 5.0  Wrist-ADM 8   4.9  -    Bel Elbow 6.4 - 11.1 - 0 Bel Elbow-Wrist 19 54  > 48 4.9 -5.4 -    Abv Elbow 8.2 - 11.0 - -0.90 Abv Elbow-Bel Elbow 9 50  > 48 5.5 -3.7 -    Right Ulnar (ADM) Motor   Wrist 3.0  < 3.5 12.2  > 5.0  Wrist-ADM 8   5.8  -    Bel Elbow 6.7 - 10.7 - -12.3 Bel Elbow-Wrist 20 54  > 48 5.5 -6.3 -    Abv Elbow 8.5 - 10.9 - 1.87 Abv Elbow-Bel Elbow 9 50  > 48 6.3 -1.60 -      Sensory Sites      Onset Lat Peak Lat Amp (O-P) Amp (P-P) Segment Distance Velocity Temperature Comment   Site ms ms  V Norm  V  cm m/s Norm  C    Left Median Sensory   Wrist-Dig II 2.4 3.1 49  > 10 79 Wrist-Dig II 14 58  > 48 -    Right Median Sensory   Wrist-Dig II 2.1 2.9 78  > 10 80 Wrist-Dig II 14 67  > 48 -    Left Median-Ulnar Palmar Sensory        Median   Palm-Wrist 1.35 1.80 27 - 38 Palm-Wrist 8 59 - -         Ulnar   Palm-Wrist 1.48 2.0 28 - 34 Palm-Wrist 8 54 - -    Right Median-Ulnar Palmar Sensory        Median   Palm-Wrist 1.30 1.78 57 - 83 Palm-Wrist 8 62 - -         Ulnar   Palm-Wrist 1.35 1.90 30 - 39 Palm-Wrist 8 59 - -    Left Radial Sensory   Forearm-Wrist 1.65 2.2 56  > 15 80 Forearm-Wrist 10 61 - -    Left Ulnar Sensory   Wrist-Dig V 2.3 3.1 35  > 8 89 Wrist-Dig V 12.5 54  > 48 -    Right Ulnar Sensory   Wrist-Dig V 2.1 2.9 42  > 8 39 Wrist-Dig V 12.5 60  > 48 -      Inter-Nerve Comparisons     Nerve 1 Value 1 Nerve 2 Value 2 Parameter Result Normal   Sensory Sites   R Median Palm-Wrist 1.8 ms R Ulnar  Palm-Wrist 1.9 ms Peak Lat Diff 0.12 ms <0.40   L Median Palm-Wrist 1.8 ms L Ulnar Palm-Wrist 2.0 ms Peak Lat Diff 0.20 ms <0.40     F Wave Studies     Min-F Max-F Dispersion Persistence Mean-F F-Norm L-R Mean-F L-R Mean-F Norm F/M Ratio F-M Lat (ms)   Left Ulnar (Abd Dig Min)   26.48 29.14 2.66 100.00 27.52 <36  <2.5 0.77 23.28       Electromyography     Side Muscle Ins Act Fibs/PSW Fasc HF Amp Dur Poly Recrt Int Pat   Right FDI Nml None Nml 0 Nml Nml 0 Nml Nml   Right Triceps Nml None Nml 0 Nml Nml 0 Nml Nml   Right Deltoid Nml None Nml 0 Nml Nml 0 Nml Nml   Right Pronator Teres Nml None Nml 0 Nml Nml 0 Nml Nml   Right EDC Nml None Nml 0 Nml Nml 0 Nml Nml   Right APB Incr None Nml 0 Nml Nml 0 Nml Nml   Left FDI Nml None Nml 0 Nml Nml 0 Nml Nml   Left EDC Nml None Nml 0 Nml Nml 0 Nml Nml   Left Triceps Nml None Nml 0 Nml Nml 0 Nml Nml   Left Deltoid Nml None Nml 0 Nml Nml 0 Nml Nml   Left Pronator Teres Nml None Nml 0 Nml Nml 0 Nml Nml   Left APB Nml None Nml 0 Nml Nml 0 Nml Nml         NCS Waveforms:    Motor                Sensory                            Ultrasound Images:

## 2024-05-19 ENCOUNTER — HEALTH MAINTENANCE LETTER (OUTPATIENT)
Age: 24
End: 2024-05-19

## 2024-06-04 LAB
HEPATITIS B SURFACE ANTIGEN (EXTERNAL): NEGATIVE
HEPATITIS C ANTIBODY (EXTERNAL): NONREACTIVE
HIV1+2 AB SERPL QL IA: NONREACTIVE
RUBELLA ANTIBODY IGG (EXTERNAL): NORMAL

## 2024-09-04 LAB — TREPONEMA PALLIDUM ANTIBODY (EXTERNAL): NONREACTIVE

## 2024-10-02 ENCOUNTER — TRANSFERRED RECORDS (OUTPATIENT)
Dept: HEALTH INFORMATION MANAGEMENT | Facility: CLINIC | Age: 24
End: 2024-10-02
Payer: COMMERCIAL

## 2024-10-12 ENCOUNTER — TRANSFERRED RECORDS (OUTPATIENT)
Dept: HEALTH INFORMATION MANAGEMENT | Facility: CLINIC | Age: 24
End: 2024-10-12

## 2024-10-12 ENCOUNTER — HOSPITAL ENCOUNTER (INPATIENT)
Facility: CLINIC | Age: 24
LOS: 5 days | Discharge: HOME HEALTH | End: 2024-10-18
Attending: STUDENT IN AN ORGANIZED HEALTH CARE EDUCATION/TRAINING PROGRAM | Admitting: STUDENT IN AN ORGANIZED HEALTH CARE EDUCATION/TRAINING PROGRAM
Payer: COMMERCIAL

## 2024-10-12 DIAGNOSIS — O36.5931 POOR FETAL GROWTH AFFECTING MANAGEMENT OF MOTHER IN THIRD TRIMESTER, FETUS 1 OF MULTIPLE GESTATION: ICD-10-CM

## 2024-10-12 LAB
ABO/RH(D): NORMAL
ALBUMIN UR-MCNC: NEGATIVE MG/DL
ANTIBODY SCREEN: NEGATIVE
APPEARANCE UR: CLEAR
BACTERIA #/AREA URNS HPF: ABNORMAL /HPF
BASOPHILS # BLD AUTO: 0 10E3/UL (ref 0–0.2)
BASOPHILS NFR BLD AUTO: 0 %
BILIRUB UR QL STRIP: NEGATIVE
CLUE CELLS: ABNORMAL
COLOR UR AUTO: ABNORMAL
CRYSTALS AMN MICRO: NORMAL
EOSINOPHIL # BLD AUTO: 0.1 10E3/UL (ref 0–0.7)
EOSINOPHIL NFR BLD AUTO: 2 %
ERYTHROCYTE [DISTWIDTH] IN BLOOD BY AUTOMATED COUNT: 14 % (ref 10–15)
GLUCOSE UR STRIP-MCNC: NEGATIVE MG/DL
HCT VFR BLD AUTO: 32.2 % (ref 35–47)
HGB BLD-MCNC: 10.8 G/DL (ref 11.7–15.7)
HGB UR QL STRIP: NEGATIVE
IMM GRANULOCYTES # BLD: 0.1 10E3/UL
IMM GRANULOCYTES NFR BLD: 1 %
KETONES UR STRIP-MCNC: NEGATIVE MG/DL
LEUKOCYTE ESTERASE UR QL STRIP: ABNORMAL
LYMPHOCYTES # BLD AUTO: 0.8 10E3/UL (ref 0.8–5.3)
LYMPHOCYTES NFR BLD AUTO: 11 %
MCH RBC QN AUTO: 26.1 PG (ref 26.5–33)
MCHC RBC AUTO-ENTMCNC: 33.5 G/DL (ref 31.5–36.5)
MCV RBC AUTO: 78 FL (ref 78–100)
MONOCYTES # BLD AUTO: 0.9 10E3/UL (ref 0–1.3)
MONOCYTES NFR BLD AUTO: 12 %
NEUTROPHILS # BLD AUTO: 5.3 10E3/UL (ref 1.6–8.3)
NEUTROPHILS NFR BLD AUTO: 74 %
NITRATE UR QL: NEGATIVE
NRBC # BLD AUTO: 0 10E3/UL
NRBC BLD AUTO-RTO: 0 /100
PH UR STRIP: 6.5 [PH] (ref 5–7)
PLATELET # BLD AUTO: 122 10E3/UL (ref 150–450)
RBC # BLD AUTO: 4.14 10E6/UL (ref 3.8–5.2)
RBC URINE: <1 /HPF
SP GR UR STRIP: 1 (ref 1–1.03)
SPECIMEN EXPIRATION DATE: NORMAL
SQUAMOUS EPITHELIAL: 2 /HPF
TRICHOMONAS, WET PREP: ABNORMAL
UROBILINOGEN UR STRIP-MCNC: NORMAL MG/DL
WBC # BLD AUTO: 7.1 10E3/UL (ref 4–11)
WBC URINE: 2 /HPF
WBC'S/HIGH POWER FIELD, WET PREP: ABNORMAL
YEAST, WET PREP: PRESENT

## 2024-10-12 PROCEDURE — G0463 HOSPITAL OUTPT CLINIC VISIT: HCPCS

## 2024-10-12 PROCEDURE — 87491 CHLMYD TRACH DNA AMP PROBE: CPT | Performed by: STUDENT IN AN ORGANIZED HEALTH CARE EDUCATION/TRAINING PROGRAM

## 2024-10-12 PROCEDURE — 87653 STREP B DNA AMP PROBE: CPT | Performed by: STUDENT IN AN ORGANIZED HEALTH CARE EDUCATION/TRAINING PROGRAM

## 2024-10-12 PROCEDURE — 99231 SBSQ HOSP IP/OBS SF/LOW 25: CPT | Performed by: NURSE PRACTITIONER

## 2024-10-12 PROCEDURE — 81001 URINALYSIS AUTO W/SCOPE: CPT | Performed by: STUDENT IN AN ORGANIZED HEALTH CARE EDUCATION/TRAINING PROGRAM

## 2024-10-12 PROCEDURE — 86900 BLOOD TYPING SEROLOGIC ABO: CPT | Performed by: STUDENT IN AN ORGANIZED HEALTH CARE EDUCATION/TRAINING PROGRAM

## 2024-10-12 PROCEDURE — 250N000013 HC RX MED GY IP 250 OP 250 PS 637: Performed by: STUDENT IN AN ORGANIZED HEALTH CARE EDUCATION/TRAINING PROGRAM

## 2024-10-12 PROCEDURE — 85025 COMPLETE CBC W/AUTO DIFF WBC: CPT | Performed by: STUDENT IN AN ORGANIZED HEALTH CARE EDUCATION/TRAINING PROGRAM

## 2024-10-12 PROCEDURE — 258N000003 HC RX IP 258 OP 636: Performed by: STUDENT IN AN ORGANIZED HEALTH CARE EDUCATION/TRAINING PROGRAM

## 2024-10-12 PROCEDURE — 86901 BLOOD TYPING SEROLOGIC RH(D): CPT | Performed by: STUDENT IN AN ORGANIZED HEALTH CARE EDUCATION/TRAINING PROGRAM

## 2024-10-12 PROCEDURE — 87210 SMEAR WET MOUNT SALINE/INK: CPT | Performed by: STUDENT IN AN ORGANIZED HEALTH CARE EDUCATION/TRAINING PROGRAM

## 2024-10-12 PROCEDURE — 87591 N.GONORRHOEAE DNA AMP PROB: CPT | Performed by: STUDENT IN AN ORGANIZED HEALTH CARE EDUCATION/TRAINING PROGRAM

## 2024-10-12 PROCEDURE — 96372 THER/PROPH/DIAG INJ SC/IM: CPT | Performed by: STUDENT IN AN ORGANIZED HEALTH CARE EDUCATION/TRAINING PROGRAM

## 2024-10-12 PROCEDURE — 36415 COLL VENOUS BLD VENIPUNCTURE: CPT | Performed by: STUDENT IN AN ORGANIZED HEALTH CARE EDUCATION/TRAINING PROGRAM

## 2024-10-12 PROCEDURE — 250N000011 HC RX IP 250 OP 636: Performed by: STUDENT IN AN ORGANIZED HEALTH CARE EDUCATION/TRAINING PROGRAM

## 2024-10-12 RX ORDER — ACETAMINOPHEN 500 MG
1000 TABLET ORAL EVERY 6 HOURS PRN
Status: DISCONTINUED | OUTPATIENT
Start: 2024-10-12 | End: 2024-10-15 | Stop reason: HOSPADM

## 2024-10-12 RX ORDER — PENICILLIN G POTASSIUM 5000000 [IU]/1
5 INJECTION, POWDER, FOR SOLUTION INTRAMUSCULAR; INTRAVENOUS ONCE
Status: COMPLETED | OUTPATIENT
Start: 2024-10-12 | End: 2024-10-12

## 2024-10-12 RX ORDER — FLUCONAZOLE 150 MG/1
150 TABLET ORAL ONCE
Status: COMPLETED | OUTPATIENT
Start: 2024-10-12 | End: 2024-10-12

## 2024-10-12 RX ORDER — BETAMETHASONE SODIUM PHOSPHATE AND BETAMETHASONE ACETATE 3; 3 MG/ML; MG/ML
12 INJECTION, SUSPENSION INTRA-ARTICULAR; INTRALESIONAL; INTRAMUSCULAR; SOFT TISSUE EVERY 24 HOURS
Status: COMPLETED | OUTPATIENT
Start: 2024-10-12 | End: 2024-10-13

## 2024-10-12 RX ORDER — SODIUM CHLORIDE, SODIUM LACTATE, POTASSIUM CHLORIDE, CALCIUM CHLORIDE 600; 310; 30; 20 MG/100ML; MG/100ML; MG/100ML; MG/100ML
INJECTION, SOLUTION INTRAVENOUS CONTINUOUS
Status: DISCONTINUED | OUTPATIENT
Start: 2024-10-12 | End: 2024-10-17

## 2024-10-12 RX ORDER — NIFEDIPINE 10 MG/1
20 CAPSULE ORAL ONCE
Status: COMPLETED | OUTPATIENT
Start: 2024-10-12 | End: 2024-10-12

## 2024-10-12 RX ORDER — HYDROXYZINE HYDROCHLORIDE 50 MG/1
100 TABLET, FILM COATED ORAL
Status: DISCONTINUED | OUTPATIENT
Start: 2024-10-12 | End: 2024-10-13

## 2024-10-12 RX ORDER — METOCLOPRAMIDE HYDROCHLORIDE 5 MG/ML
10 INJECTION INTRAMUSCULAR; INTRAVENOUS EVERY 6 HOURS PRN
Status: DISCONTINUED | OUTPATIENT
Start: 2024-10-12 | End: 2024-10-16

## 2024-10-12 RX ORDER — PENICILLIN G 3000000 [IU]/50ML
3 INJECTION, SOLUTION INTRAVENOUS EVERY 4 HOURS
Status: DISCONTINUED | OUTPATIENT
Start: 2024-10-12 | End: 2024-10-15 | Stop reason: HOSPADM

## 2024-10-12 RX ORDER — NIFEDIPINE 10 MG/1
20 CAPSULE ORAL EVERY 6 HOURS
Status: DISCONTINUED | OUTPATIENT
Start: 2024-10-12 | End: 2024-10-14

## 2024-10-12 RX ADMIN — PENICILLIN G 3 MILLION UNITS: 3000000 INJECTION, SOLUTION INTRAVENOUS at 21:30

## 2024-10-12 RX ADMIN — BETAMETHASONE SODIUM PHOSPHATE AND BETAMETHASONE ACETATE 12 MG: 3; 3 INJECTION, SUSPENSION INTRA-ARTICULAR; INTRALESIONAL; INTRAMUSCULAR at 17:31

## 2024-10-12 RX ADMIN — PENICILLIN G POTASSIUM 5 MILLION UNITS: 5000000 POWDER, FOR SOLUTION INTRAMUSCULAR; INTRAPLEURAL; INTRATHECAL; INTRAVENOUS at 17:39

## 2024-10-12 RX ADMIN — NIFEDIPINE 20 MG: 10 CAPSULE ORAL at 23:28

## 2024-10-12 RX ADMIN — NIFEDIPINE 20 MG: 10 CAPSULE ORAL at 17:29

## 2024-10-12 RX ADMIN — SODIUM CHLORIDE, POTASSIUM CHLORIDE, SODIUM LACTATE AND CALCIUM CHLORIDE 1000 ML: 600; 310; 30; 20 INJECTION, SOLUTION INTRAVENOUS at 17:19

## 2024-10-12 RX ADMIN — HYDROXYZINE HYDROCHLORIDE 100 MG: 50 TABLET, FILM COATED ORAL at 21:42

## 2024-10-12 RX ADMIN — SERTRALINE HYDROCHLORIDE 50 MG: 50 TABLET ORAL at 17:46

## 2024-10-12 RX ADMIN — METOCLOPRAMIDE 10 MG: 5 INJECTION, SOLUTION INTRAMUSCULAR; INTRAVENOUS at 21:46

## 2024-10-12 RX ADMIN — FLUCONAZOLE 150 MG: 150 TABLET ORAL at 23:28

## 2024-10-12 RX ADMIN — ACETAMINOPHEN 1000 MG: 500 TABLET, FILM COATED ORAL at 17:30

## 2024-10-12 ASSESSMENT — ACTIVITIES OF DAILY LIVING (ADL)
ADLS_ACUITY_SCORE: 18
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
## 2024-10-12 NOTE — PROVIDER NOTIFICATION
10/12/24 1620   Provider Notification   Provider Name/Title Dr. Gomez   Method of Notification At Bedside   Request Evaluate in Person   Notification Reason Status Update;SVE     MD notified that a wet prep, ua and fern were collected per orders and then a cervical exam was done after and pt was 3/60/-3.  Provider then came to bedside to discuss plan of care with pt and more labs were then collected, gbs, gonorrhea, chlamydia and then MD did another cervical exam to confirm SVE.  Orders then to keep pt overnight give some fluids, betamethasone, nifedipine, pencillin and continue to monitor.

## 2024-10-13 ENCOUNTER — ANESTHESIA EVENT (OUTPATIENT)
Dept: OBGYN | Facility: CLINIC | Age: 24
End: 2024-10-13
Payer: COMMERCIAL

## 2024-10-13 ENCOUNTER — ANESTHESIA (OUTPATIENT)
Dept: OBGYN | Facility: CLINIC | Age: 24
End: 2024-10-13
Payer: COMMERCIAL

## 2024-10-13 LAB
C TRACH DNA SPEC QL NAA+PROBE: NEGATIVE
GP B STREP DNA SPEC QL NAA+PROBE: POSITIVE
N GONORRHOEA DNA SPEC QL NAA+PROBE: NEGATIVE

## 2024-10-13 PROCEDURE — 250N000013 HC RX MED GY IP 250 OP 250 PS 637: Performed by: OBSTETRICS & GYNECOLOGY

## 2024-10-13 PROCEDURE — 250N000011 HC RX IP 250 OP 636: Performed by: STUDENT IN AN ORGANIZED HEALTH CARE EDUCATION/TRAINING PROGRAM

## 2024-10-13 PROCEDURE — 120N000013 HC R&B IMCU

## 2024-10-13 PROCEDURE — 250N000013 HC RX MED GY IP 250 OP 250 PS 637: Performed by: STUDENT IN AN ORGANIZED HEALTH CARE EDUCATION/TRAINING PROGRAM

## 2024-10-13 PROCEDURE — 250N000011 HC RX IP 250 OP 636: Performed by: OBSTETRICS & GYNECOLOGY

## 2024-10-13 RX ORDER — FENTANYL CITRATE 50 UG/ML
100 INJECTION, SOLUTION INTRAMUSCULAR; INTRAVENOUS
Status: DISCONTINUED | OUTPATIENT
Start: 2024-10-13 | End: 2024-10-18 | Stop reason: HOSPADM

## 2024-10-13 RX ORDER — NALBUPHINE HYDROCHLORIDE 10 MG/ML
2.5-5 INJECTION INTRAMUSCULAR; INTRAVENOUS; SUBCUTANEOUS EVERY 6 HOURS PRN
Status: DISCONTINUED | OUTPATIENT
Start: 2024-10-13 | End: 2024-10-18 | Stop reason: HOSPADM

## 2024-10-13 RX ORDER — ONDANSETRON 2 MG/ML
4 INJECTION INTRAMUSCULAR; INTRAVENOUS EVERY 6 HOURS PRN
Status: DISCONTINUED | OUTPATIENT
Start: 2024-10-13 | End: 2024-10-15 | Stop reason: HOSPADM

## 2024-10-13 RX ORDER — NALOXONE HYDROCHLORIDE 0.4 MG/ML
0.4 INJECTION, SOLUTION INTRAMUSCULAR; INTRAVENOUS; SUBCUTANEOUS
Status: DISCONTINUED | OUTPATIENT
Start: 2024-10-13 | End: 2024-10-18 | Stop reason: HOSPADM

## 2024-10-13 RX ORDER — LIDOCAINE HYDROCHLORIDE AND EPINEPHRINE 15; 5 MG/ML; UG/ML
3 INJECTION, SOLUTION EPIDURAL
Status: DISCONTINUED | OUTPATIENT
Start: 2024-10-13 | End: 2024-10-15 | Stop reason: HOSPADM

## 2024-10-13 RX ORDER — FENTANYL CITRATE-0.9 % NACL/PF 10 MCG/ML
100 PLASTIC BAG, INJECTION (ML) INTRAVENOUS EVERY 5 MIN PRN
Status: DISCONTINUED | OUTPATIENT
Start: 2024-10-13 | End: 2024-10-15 | Stop reason: HOSPADM

## 2024-10-13 RX ORDER — BUPIVACAINE HYDROCHLORIDE 2.5 MG/ML
10 INJECTION, SOLUTION EPIDURAL; INFILTRATION; INTRACAUDAL ONCE
Status: DISCONTINUED | OUTPATIENT
Start: 2024-10-13 | End: 2024-10-15 | Stop reason: HOSPADM

## 2024-10-13 RX ORDER — HYDROXYZINE HYDROCHLORIDE 50 MG/1
100 TABLET, FILM COATED ORAL EVERY 6 HOURS PRN
Status: DISCONTINUED | OUTPATIENT
Start: 2024-10-13 | End: 2024-10-13

## 2024-10-13 RX ORDER — NALOXONE HYDROCHLORIDE 0.4 MG/ML
0.2 INJECTION, SOLUTION INTRAMUSCULAR; INTRAVENOUS; SUBCUTANEOUS
Status: DISCONTINUED | OUTPATIENT
Start: 2024-10-13 | End: 2024-10-18 | Stop reason: HOSPADM

## 2024-10-13 RX ORDER — HYDROXYZINE HYDROCHLORIDE 50 MG/1
100 TABLET, FILM COATED ORAL EVERY 6 HOURS PRN
Status: DISCONTINUED | OUTPATIENT
Start: 2024-10-13 | End: 2024-10-18 | Stop reason: HOSPADM

## 2024-10-13 RX ORDER — ONDANSETRON 4 MG/1
4 TABLET, ORALLY DISINTEGRATING ORAL EVERY 6 HOURS PRN
Status: DISCONTINUED | OUTPATIENT
Start: 2024-10-13 | End: 2024-10-15 | Stop reason: HOSPADM

## 2024-10-13 RX ADMIN — PENICILLIN G 3 MILLION UNITS: 3000000 INJECTION, SOLUTION INTRAVENOUS at 22:00

## 2024-10-13 RX ADMIN — PENICILLIN G 3 MILLION UNITS: 3000000 INJECTION, SOLUTION INTRAVENOUS at 14:04

## 2024-10-13 RX ADMIN — HYDROXYZINE HYDROCHLORIDE 100 MG: 50 TABLET, FILM COATED ORAL at 20:11

## 2024-10-13 RX ADMIN — PENICILLIN G 3 MILLION UNITS: 3000000 INJECTION, SOLUTION INTRAVENOUS at 01:29

## 2024-10-13 RX ADMIN — ACETAMINOPHEN 1000 MG: 500 TABLET, FILM COATED ORAL at 10:01

## 2024-10-13 RX ADMIN — NIFEDIPINE 20 MG: 10 CAPSULE ORAL at 05:34

## 2024-10-13 RX ADMIN — NIFEDIPINE 20 MG: 10 CAPSULE ORAL at 11:35

## 2024-10-13 RX ADMIN — FENTANYL CITRATE 100 MCG: 50 INJECTION, SOLUTION INTRAMUSCULAR; INTRAVENOUS at 15:55

## 2024-10-13 RX ADMIN — NIFEDIPINE 20 MG: 10 CAPSULE ORAL at 23:21

## 2024-10-13 RX ADMIN — HYDROXYZINE HYDROCHLORIDE 100 MG: 50 TABLET, FILM COATED ORAL at 13:07

## 2024-10-13 RX ADMIN — NIFEDIPINE 20 MG: 10 CAPSULE ORAL at 17:18

## 2024-10-13 RX ADMIN — PENICILLIN G 3 MILLION UNITS: 3000000 INJECTION, SOLUTION INTRAVENOUS at 18:00

## 2024-10-13 RX ADMIN — ACETAMINOPHEN 1000 MG: 500 TABLET, FILM COATED ORAL at 19:25

## 2024-10-13 RX ADMIN — PENICILLIN G 3 MILLION UNITS: 3000000 INJECTION, SOLUTION INTRAVENOUS at 05:33

## 2024-10-13 RX ADMIN — BETAMETHASONE SODIUM PHOSPHATE AND BETAMETHASONE ACETATE 12 MG: 3; 3 INJECTION, SUSPENSION INTRA-ARTICULAR; INTRALESIONAL; INTRAMUSCULAR at 17:15

## 2024-10-13 RX ADMIN — ACETAMINOPHEN 1000 MG: 500 TABLET, FILM COATED ORAL at 01:29

## 2024-10-13 RX ADMIN — SERTRALINE HYDROCHLORIDE 50 MG: 50 TABLET ORAL at 10:02

## 2024-10-13 RX ADMIN — PENICILLIN G 3 MILLION UNITS: 3000000 INJECTION, SOLUTION INTRAVENOUS at 09:52

## 2024-10-13 ASSESSMENT — ACTIVITIES OF DAILY LIVING (ADL)
ADLS_ACUITY_SCORE: 18

## 2024-10-13 NOTE — PROVIDER NOTIFICATION
10/13/24 0930   Provider Notification   Provider Name/Title DR Reardon   Method of Notification At Bedside   Request Evaluate in Person   Notification Reason Other (Comment)     Provider at BS talked to pr reg POC ,plan is pt can be  discharge today after 2nd dose of Beta in the evening and if same condition.Further more now pt lives in safe place with her parents, F/U with OB [facility],to be seen Social work as out patient and next BPP/Dopplers US due next week 10/18.

## 2024-10-13 NOTE — PROVIDER NOTIFICATION
10/13/24 1100   Provider Notification   Provider Name/Title DR Reardon   Method of Notification In Department   Request Evaluate - Remote   Notification Reason Other (Comment);Status Update;Patient Request     Updated Provider Pt is taking eating and drinking,up to BR independently,cramps  are irregular now and FHT most of the part  is moderate variability, and has sleep cycle.NO VB and LOF. Pt has reported transportation issues for Medical appointments therefore pt will be seen  as out patient if she gets home today otherwise to be seen by  tomorrow here; will place orders and IV fluids stopped.
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
## 2024-10-13 NOTE — PROVIDER NOTIFICATION
10/13/24 1500   Provider Notification   Provider Name/Title DR Wilkinson   Method of Notification Phone   Request Evaluate - Remote   Notification Reason Uterine Activity;Pain;Status Update;SVE;Other (Comment)     Upadated Provider via [employer] message, pt reported more frequent cramps along with rectal pressure,conts are 7-8 min apart,mild intensity,FHT BL 140s with Moderate Variability with no accel,SVE done 4/80% effaced head at -1/0.Changed from last exam.Pt looks uncomfortable.orders received IV fluid bolus of 500 cc, Pain management with Fentanyl,until she gets her 2nd dose of BMZ , PO Nifedipine continue watch and monitor,and changed pt status from Out Patient to in patient plan of care d/w pt and she voiced understandings.
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
## 2024-10-13 NOTE — PROVIDER NOTIFICATION
"   10/13/24 0335   Provider Notification   Provider Name/Title Dr. Gomez   Method of Notification In Department   Request Evaluate - Remote   Notification Reason Uterine Activity;Status Update     MD in department and updated that patient reports \"feeling much better.\" Reports last feeling cxs around midnight, and then has just felt intermittent mild cramping/irritability. Able to get some rest earlier in the night, and now visiting with her mom at bedside. Reports nausea has been well controlled overnight and headache feeling better after rest / tylenol. FHR tracing majority moderate variability with + accels. Periods of minimal variability and difficulty tracing with repositioning. Took her x1 dose of diflucan this evening. Will continue with PCN and oral Nifedipine.   "

## 2024-10-13 NOTE — PROVIDER NOTIFICATION
10/13/24 7065   Provider Notification   Provider Name/Title Dr Reardon   Method of Notification Phone   Request Evaluate - Remote   Notification Reason Status Update;Other (Comment)     Pt reported she is feeling better after Fentanyl and IV fluid bolus,now conts are irregular less painful.Pt appears comfortable, family at BS and she is talking and having food.

## 2024-10-13 NOTE — PLAN OF CARE
Goal Outcome Evaluation:      Plan of Care Reviewed With: patient    Overall Patient Progress: improvingOverall Patient Progress: improving  Data: Contraction pattern stable and within parameters. Conts are stable after IV fluids and Fentanyl Fetal assessment Appropriate for Gestational Age.  Interventions: Continue uterine/fetal assessment done. Activity level:Bed rest with bathroom privileges, and preventive measures including Medications and Frequent voiding and IV fluid bolus. Encourage active range of motion and frequent position changes.  Plan: Continue expectant management. Observe for and notify care provider of indications of progressing labor or signs of fetal/maternal compromise.

## 2024-10-13 NOTE — CONSULTS
Neonatology Antepartum Consultation    Beverly Pacheco MRN# [id number]   YOB: 2000 Age: 24 year old   Date of Admission: 10/12/2024     Reason for consult: I was asked by Dr. Gomez to complete an antepartum NICU consult for this patient experiencing  labor and cervical dilation.       She is currently 33 4/7 weeks. She was admitted on 10/12/2024 for  labor. Betamethasone was administered on 10/12. Ms. Pacheco was counseled (her mother was on the phone for the consult) on the expected hospital course, potential risks, and outcomes associated with an infant born at approximately 33 weeks gestation. The counseling included: morbidity, mortality, initial delivery room stabilization, respiratory course, lung development, chronic lung disease, hemodynamic support, infection, intraventricular hemorrhage, hyperbilirubinemia, nutrition, growth and development, and long term outcomes. Please feel free to call with any additional questions or concerns. Ms. Pacheco stated her youngest sister was born at 23 weeks (she was 12 years old at the time) and has memories of her family's experience. Her sister has done well. Ms. Pacheco questions were answered. Thank you for the consult. Please feel free to call with any questions.      Face to face time: 20 minutes  Floor time: 10 minutes  Total Time: 30 minutes, in which greater than 50% of the time was spent in direct patient contact.  Iris DAWKINS CNP 10/12/2024 9:26 PM

## 2024-10-13 NOTE — PROVIDER NOTIFICATION
"   10/13/24 1256   Provider Notification   Provider Name/Title Dr. Reardon   Method of Notification In Department   Request Evaluate - Remote   Notification Reason Pain     Dr. Reardon at nurse station and updated on patient with report if increased pain and abdominal cramping. Patient currently using tylenol with minimal relief. Patient reports \"the medication I had last night helped the most.\" Patient MAR shows patient had hydroxyzine last evening, writer requesting frequency be changed. Order received for hydroxyzine 100mg every 6 hours PRN  "

## 2024-10-14 PROCEDURE — 120N000013 HC R&B IMCU

## 2024-10-14 PROCEDURE — 250N000013 HC RX MED GY IP 250 OP 250 PS 637: Performed by: STUDENT IN AN ORGANIZED HEALTH CARE EDUCATION/TRAINING PROGRAM

## 2024-10-14 PROCEDURE — 250N000011 HC RX IP 250 OP 636: Performed by: STUDENT IN AN ORGANIZED HEALTH CARE EDUCATION/TRAINING PROGRAM

## 2024-10-14 PROCEDURE — 258N000003 HC RX IP 258 OP 636: Performed by: OBSTETRICS & GYNECOLOGY

## 2024-10-14 PROCEDURE — 250N000013 HC RX MED GY IP 250 OP 250 PS 637: Performed by: OBSTETRICS & GYNECOLOGY

## 2024-10-14 RX ORDER — CALCIUM CARBONATE 500 MG/1
500 TABLET, CHEWABLE ORAL 2 TIMES DAILY PRN
Status: DISCONTINUED | OUTPATIENT
Start: 2024-10-14 | End: 2024-10-18 | Stop reason: HOSPADM

## 2024-10-14 RX ADMIN — NIFEDIPINE 20 MG: 10 CAPSULE ORAL at 05:19

## 2024-10-14 RX ADMIN — PENICILLIN G 3 MILLION UNITS: 3000000 INJECTION, SOLUTION INTRAVENOUS at 18:52

## 2024-10-14 RX ADMIN — ACETAMINOPHEN 1000 MG: 500 TABLET, FILM COATED ORAL at 02:01

## 2024-10-14 RX ADMIN — SODIUM CHLORIDE, POTASSIUM CHLORIDE, SODIUM LACTATE AND CALCIUM CHLORIDE 500 ML: 600; 310; 30; 20 INJECTION, SOLUTION INTRAVENOUS at 17:04

## 2024-10-14 RX ADMIN — CALCIUM CARBONATE (ANTACID) CHEW TAB 500 MG 500 MG: 500 CHEW TAB at 11:59

## 2024-10-14 RX ADMIN — PENICILLIN G 3 MILLION UNITS: 3000000 INJECTION, SOLUTION INTRAVENOUS at 06:56

## 2024-10-14 RX ADMIN — PENICILLIN G 3 MILLION UNITS: 3000000 INJECTION, SOLUTION INTRAVENOUS at 02:02

## 2024-10-14 RX ADMIN — PENICILLIN G 3 MILLION UNITS: 3000000 INJECTION, SOLUTION INTRAVENOUS at 23:14

## 2024-10-14 RX ADMIN — HYDROXYZINE HYDROCHLORIDE 100 MG: 50 TABLET, FILM COATED ORAL at 02:01

## 2024-10-14 RX ADMIN — PENICILLIN G 3 MILLION UNITS: 3000000 INJECTION, SOLUTION INTRAVENOUS at 15:08

## 2024-10-14 RX ADMIN — PENICILLIN G 3 MILLION UNITS: 3000000 INJECTION, SOLUTION INTRAVENOUS at 10:35

## 2024-10-14 RX ADMIN — SERTRALINE HYDROCHLORIDE 50 MG: 50 TABLET ORAL at 07:57

## 2024-10-14 RX ADMIN — METOCLOPRAMIDE 10 MG: 5 INJECTION, SOLUTION INTRAMUSCULAR; INTRAVENOUS at 14:53

## 2024-10-14 ASSESSMENT — ACTIVITIES OF DAILY LIVING (ADL)
ADLS_ACUITY_SCORE: 18

## 2024-10-14 NOTE — PROVIDER NOTIFICATION
10/14/24 0919   Provider Notification   Provider Name/Title Angella   Method of Notification Electronic Page   Request Evaluate - Remote   Notification Reason Patient Arrived     Informed MD of pt arrival and status. FHT Cat I. No cx.     Pt rating her pain 8/10, teary.     New order to check cervix, and if closed send down to ED for further evaluation.

## 2024-10-14 NOTE — PROVIDER NOTIFICATION
"   10/14/24 0518   Provider Notification   Provider Name/Title Dr. Reardon   Method of Notification Electronic Page   Request Evaluate - Remote   Notification Reason Decels;Uterine Activity     Dr. Reardon updated via Spotlight: patient has been cxing irregularly throughout the night. Reports that most feel like \"cramping in my lower stomach and hips,\" and occasionally describes as \"tightness in my stomach.\" Patient says that these cxs are not as painful as the ones she experienced during the afternoon. Has been taking tylenol and atarax q6 hrs per MAR. FHR tracing majority moderate variability with 15x15 accels present. Occasional variables throughout the night. Prolonged decel at 0459 lasting 2 minutes. Decrease from baseline of 145, with otoniel to 110, FHR recovered by self. Nifedipine given at 0519. Patient emptied bladder, refilled ice water, and repositioned to left side.     Per MD: will stop nifedipine at this time. No other new orders given at this time.   "

## 2024-10-14 NOTE — PROVIDER NOTIFICATION
10/14/24 0720   Provider Notification   Provider Name/Title Derick   Method of Notification Electronic Page   Request Evaluate in Person   Notification Reason Decels     Informed pt having decels, otoniel to 90 lasting 1-2 minutes. Requesting provider to bedside to discuss plan for the morning.
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
## 2024-10-14 NOTE — PLAN OF CARE
"Patient called RN to bedside after using bathroom. Pt reports \"I went to the bathroom and then after I was done, I coughed and felt another trickle of fluid.\" After getting back in bed, patient hasn't felt anymore fluid trickling. Encouraged to put a pad on and lay on side for about 25-30 minutes to check for any pooling/fluid.    Rechecked at 0135 and pad is dry/no fluid noted. Patient reports she hasn't felt anymore trickling since using bathroom. Will continue to monitor.   "

## 2024-10-14 NOTE — PROVIDER NOTIFICATION
10/14/24 0725   Provider Notification   Provider Name/Title Derick   Method of Notification At Bedside     Pt repositioned, bolus started.     Plan per provider to check cervix this morning around 0800. Pt to be NPO until plan in place, last ate last evening.

## 2024-10-14 NOTE — PROVIDER NOTIFICATION
10/14/24 0800   Provider Notification   Provider Name/Title Angella   Method of Notification In Department   Request Evaluate - Remote   Notification Reason Status Update     Informed cervix 4.5/90/0, very soft. No further decels with bolus and reposition changes.     Pt ok to eat a light breakfast.
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
## 2024-10-14 NOTE — PROVIDER NOTIFICATION
10/14/24 1716   Provider Notification   Provider Name/Title Rina   Method of Notification Electronic Page   Request Evaluate - Remote   Notification Reason Decels     FYI running a second bolus today due to late decels. One variable decel that is broken on the monitor but RN at bedside, audible otoniel to about the 80's. Pt has been repositioned. Pt is sleeping, very comfortable, not feeling her cx. Her cx palpate very mild.     Moderate variability throughout.

## 2024-10-15 LAB
BASOPHILS # BLD AUTO: 0 10E3/UL (ref 0–0.2)
BASOPHILS NFR BLD AUTO: 0 %
CREAT SERPL-MCNC: 0.56 MG/DL (ref 0.51–0.95)
EGFRCR SERPLBLD CKD-EPI 2021: >90 ML/MIN/1.73M2
EOSINOPHIL # BLD AUTO: 0 10E3/UL (ref 0–0.7)
EOSINOPHIL NFR BLD AUTO: 0 %
ERYTHROCYTE [DISTWIDTH] IN BLOOD BY AUTOMATED COUNT: 14.3 % (ref 10–15)
HCT VFR BLD AUTO: 30 % (ref 35–47)
HGB BLD-MCNC: 10.1 G/DL (ref 11.7–15.7)
IMM GRANULOCYTES # BLD: 0.2 10E3/UL
IMM GRANULOCYTES NFR BLD: 1 %
LYMPHOCYTES # BLD AUTO: 1.6 10E3/UL (ref 0.8–5.3)
LYMPHOCYTES NFR BLD AUTO: 11 %
MCH RBC QN AUTO: 26.3 PG (ref 26.5–33)
MCHC RBC AUTO-ENTMCNC: 33.7 G/DL (ref 31.5–36.5)
MCV RBC AUTO: 78 FL (ref 78–100)
MONOCYTES # BLD AUTO: 1.5 10E3/UL (ref 0–1.3)
MONOCYTES NFR BLD AUTO: 10 %
NEUTROPHILS # BLD AUTO: 11.1 10E3/UL (ref 1.6–8.3)
NEUTROPHILS NFR BLD AUTO: 77 %
NRBC # BLD AUTO: 0 10E3/UL
NRBC BLD AUTO-RTO: 0 /100
PLATELET # BLD AUTO: 191 10E3/UL (ref 150–450)
RBC # BLD AUTO: 3.84 10E6/UL (ref 3.8–5.2)
WBC # BLD AUTO: 14.4 10E3/UL (ref 4–11)

## 2024-10-15 PROCEDURE — 250N000011 HC RX IP 250 OP 636: Performed by: NURSE ANESTHETIST, CERTIFIED REGISTERED

## 2024-10-15 PROCEDURE — 250N000011 HC RX IP 250 OP 636: Performed by: ANESTHESIOLOGY

## 2024-10-15 PROCEDURE — 710N000010 HC RECOVERY PHASE 1, LEVEL 2, PER MIN: Performed by: STUDENT IN AN ORGANIZED HEALTH CARE EDUCATION/TRAINING PROGRAM

## 2024-10-15 PROCEDURE — 258N000003 HC RX IP 258 OP 636: Performed by: NURSE ANESTHETIST, CERTIFIED REGISTERED

## 2024-10-15 PROCEDURE — 88307 TISSUE EXAM BY PATHOLOGIST: CPT | Mod: TC | Performed by: STUDENT IN AN ORGANIZED HEALTH CARE EDUCATION/TRAINING PROGRAM

## 2024-10-15 PROCEDURE — 120N000013 HC R&B IMCU

## 2024-10-15 PROCEDURE — 36415 COLL VENOUS BLD VENIPUNCTURE: CPT | Performed by: STUDENT IN AN ORGANIZED HEALTH CARE EDUCATION/TRAINING PROGRAM

## 2024-10-15 PROCEDURE — 250N000013 HC RX MED GY IP 250 OP 250 PS 637: Performed by: STUDENT IN AN ORGANIZED HEALTH CARE EDUCATION/TRAINING PROGRAM

## 2024-10-15 PROCEDURE — 360N000076 HC SURGERY LEVEL 3, PER MIN: Performed by: STUDENT IN AN ORGANIZED HEALTH CARE EDUCATION/TRAINING PROGRAM

## 2024-10-15 PROCEDURE — 250N000009 HC RX 250: Performed by: NURSE ANESTHETIST, CERTIFIED REGISTERED

## 2024-10-15 PROCEDURE — 272N000001 HC OR GENERAL SUPPLY STERILE: Performed by: STUDENT IN AN ORGANIZED HEALTH CARE EDUCATION/TRAINING PROGRAM

## 2024-10-15 PROCEDURE — 250N000011 HC RX IP 250 OP 636: Performed by: STUDENT IN AN ORGANIZED HEALTH CARE EDUCATION/TRAINING PROGRAM

## 2024-10-15 PROCEDURE — 82565 ASSAY OF CREATININE: CPT | Performed by: STUDENT IN AN ORGANIZED HEALTH CARE EDUCATION/TRAINING PROGRAM

## 2024-10-15 PROCEDURE — 88307 TISSUE EXAM BY PATHOLOGIST: CPT | Mod: 26 | Performed by: PATHOLOGY

## 2024-10-15 PROCEDURE — 370N000017 HC ANESTHESIA TECHNICAL FEE, PER MIN: Performed by: STUDENT IN AN ORGANIZED HEALTH CARE EDUCATION/TRAINING PROGRAM

## 2024-10-15 PROCEDURE — 258N000003 HC RX IP 258 OP 636: Performed by: STUDENT IN AN ORGANIZED HEALTH CARE EDUCATION/TRAINING PROGRAM

## 2024-10-15 PROCEDURE — 85025 COMPLETE CBC W/AUTO DIFF WBC: CPT | Performed by: STUDENT IN AN ORGANIZED HEALTH CARE EDUCATION/TRAINING PROGRAM

## 2024-10-15 RX ORDER — METOCLOPRAMIDE 10 MG/1
10 TABLET ORAL EVERY 6 HOURS PRN
Status: DISCONTINUED | OUTPATIENT
Start: 2024-10-15 | End: 2024-10-16

## 2024-10-15 RX ORDER — ACETAMINOPHEN 325 MG/1
975 TABLET ORAL EVERY 6 HOURS
Status: DISCONTINUED | OUTPATIENT
Start: 2024-10-15 | End: 2024-10-18 | Stop reason: HOSPADM

## 2024-10-15 RX ORDER — LIDOCAINE 40 MG/G
CREAM TOPICAL
Status: DISCONTINUED | OUTPATIENT
Start: 2024-10-15 | End: 2024-10-18 | Stop reason: HOSPADM

## 2024-10-15 RX ORDER — AMOXICILLIN 250 MG
2 CAPSULE ORAL 2 TIMES DAILY
Status: DISCONTINUED | OUTPATIENT
Start: 2024-10-15 | End: 2024-10-18 | Stop reason: HOSPADM

## 2024-10-15 RX ORDER — CEFAZOLIN SODIUM/WATER 2 G/20 ML
2 SYRINGE (ML) INTRAVENOUS
Status: COMPLETED | OUTPATIENT
Start: 2024-10-15 | End: 2024-10-15

## 2024-10-15 RX ORDER — TRANEXAMIC ACID 10 MG/ML
1 INJECTION, SOLUTION INTRAVENOUS EVERY 30 MIN PRN
Status: DISCONTINUED | OUTPATIENT
Start: 2024-10-15 | End: 2024-10-15 | Stop reason: HOSPADM

## 2024-10-15 RX ORDER — TRANEXAMIC ACID 10 MG/ML
1 INJECTION, SOLUTION INTRAVENOUS EVERY 30 MIN PRN
Status: DISCONTINUED | OUTPATIENT
Start: 2024-10-15 | End: 2024-10-18 | Stop reason: HOSPADM

## 2024-10-15 RX ORDER — OXYTOCIN 10 [USP'U]/ML
10 INJECTION, SOLUTION INTRAMUSCULAR; INTRAVENOUS
Status: DISCONTINUED | OUTPATIENT
Start: 2024-10-15 | End: 2024-10-15 | Stop reason: HOSPADM

## 2024-10-15 RX ORDER — METHYLERGONOVINE MALEATE 0.2 MG/ML
200 INJECTION INTRAVENOUS
Status: DISCONTINUED | OUTPATIENT
Start: 2024-10-15 | End: 2024-10-15 | Stop reason: HOSPADM

## 2024-10-15 RX ORDER — SIMETHICONE 80 MG
80 TABLET,CHEWABLE ORAL 4 TIMES DAILY PRN
Status: DISCONTINUED | OUTPATIENT
Start: 2024-10-15 | End: 2024-10-18 | Stop reason: HOSPADM

## 2024-10-15 RX ORDER — OXYTOCIN 10 [USP'U]/ML
10 INJECTION, SOLUTION INTRAMUSCULAR; INTRAVENOUS
Status: DISCONTINUED | OUTPATIENT
Start: 2024-10-15 | End: 2024-10-18 | Stop reason: HOSPADM

## 2024-10-15 RX ORDER — OXYTOCIN/0.9 % SODIUM CHLORIDE 30/500 ML
100-340 PLASTIC BAG, INJECTION (ML) INTRAVENOUS CONTINUOUS PRN
Status: DISCONTINUED | OUTPATIENT
Start: 2024-10-15 | End: 2024-10-18 | Stop reason: HOSPADM

## 2024-10-15 RX ORDER — CARBOPROST TROMETHAMINE 250 UG/ML
250 INJECTION, SOLUTION INTRAMUSCULAR
Status: DISCONTINUED | OUTPATIENT
Start: 2024-10-15 | End: 2024-10-18 | Stop reason: HOSPADM

## 2024-10-15 RX ORDER — ONDANSETRON 4 MG/1
4 TABLET, ORALLY DISINTEGRATING ORAL EVERY 6 HOURS PRN
Status: DISCONTINUED | OUTPATIENT
Start: 2024-10-15 | End: 2024-10-18 | Stop reason: HOSPADM

## 2024-10-15 RX ORDER — LIDOCAINE 40 MG/G
CREAM TOPICAL
Status: DISCONTINUED | OUTPATIENT
Start: 2024-10-15 | End: 2024-10-15 | Stop reason: HOSPADM

## 2024-10-15 RX ORDER — MISOPROSTOL 200 UG/1
400 TABLET ORAL
Status: DISCONTINUED | OUTPATIENT
Start: 2024-10-15 | End: 2024-10-15 | Stop reason: HOSPADM

## 2024-10-15 RX ORDER — MODIFIED LANOLIN
OINTMENT (GRAM) TOPICAL
Status: DISCONTINUED | OUTPATIENT
Start: 2024-10-15 | End: 2024-10-18 | Stop reason: HOSPADM

## 2024-10-15 RX ORDER — LOPERAMIDE HYDROCHLORIDE 2 MG/1
2 CAPSULE ORAL
Status: DISCONTINUED | OUTPATIENT
Start: 2024-10-15 | End: 2024-10-18 | Stop reason: HOSPADM

## 2024-10-15 RX ORDER — CITRIC ACID/SODIUM CITRATE 334-500MG
30 SOLUTION, ORAL ORAL
Status: COMPLETED | OUTPATIENT
Start: 2024-10-15 | End: 2024-10-15

## 2024-10-15 RX ORDER — METOCLOPRAMIDE HYDROCHLORIDE 5 MG/ML
10 INJECTION INTRAMUSCULAR; INTRAVENOUS EVERY 6 HOURS PRN
Status: DISCONTINUED | OUTPATIENT
Start: 2024-10-15 | End: 2024-10-18 | Stop reason: HOSPADM

## 2024-10-15 RX ORDER — SODIUM PHOSPHATE,MONO-DIBASIC 19G-7G/118
1 ENEMA (ML) RECTAL DAILY PRN
Status: DISCONTINUED | OUTPATIENT
Start: 2024-10-17 | End: 2024-10-18 | Stop reason: HOSPADM

## 2024-10-15 RX ORDER — SODIUM CHLORIDE, SODIUM LACTATE, POTASSIUM CHLORIDE, CALCIUM CHLORIDE 600; 310; 30; 20 MG/100ML; MG/100ML; MG/100ML; MG/100ML
INJECTION, SOLUTION INTRAVENOUS CONTINUOUS
Status: DISCONTINUED | OUTPATIENT
Start: 2024-10-15 | End: 2024-10-15 | Stop reason: HOSPADM

## 2024-10-15 RX ORDER — LOPERAMIDE HYDROCHLORIDE 2 MG/1
4 CAPSULE ORAL
Status: DISCONTINUED | OUTPATIENT
Start: 2024-10-15 | End: 2024-10-18 | Stop reason: HOSPADM

## 2024-10-15 RX ORDER — MISOPROSTOL 200 UG/1
800 TABLET ORAL
Status: DISCONTINUED | OUTPATIENT
Start: 2024-10-15 | End: 2024-10-18 | Stop reason: HOSPADM

## 2024-10-15 RX ORDER — METHYLERGONOVINE MALEATE 0.2 MG/ML
200 INJECTION INTRAVENOUS
Status: DISCONTINUED | OUTPATIENT
Start: 2024-10-15 | End: 2024-10-18 | Stop reason: HOSPADM

## 2024-10-15 RX ORDER — LOPERAMIDE HYDROCHLORIDE 2 MG/1
4 CAPSULE ORAL
Status: DISCONTINUED | OUTPATIENT
Start: 2024-10-15 | End: 2024-10-15 | Stop reason: HOSPADM

## 2024-10-15 RX ORDER — MISOPROSTOL 200 UG/1
400 TABLET ORAL
Status: DISCONTINUED | OUTPATIENT
Start: 2024-10-15 | End: 2024-10-18 | Stop reason: HOSPADM

## 2024-10-15 RX ORDER — METOCLOPRAMIDE HYDROCHLORIDE 5 MG/ML
10 INJECTION INTRAMUSCULAR; INTRAVENOUS EVERY 6 HOURS PRN
Status: DISCONTINUED | OUTPATIENT
Start: 2024-10-15 | End: 2024-10-16

## 2024-10-15 RX ORDER — ACETAMINOPHEN 325 MG/1
975 TABLET ORAL ONCE
Status: DISCONTINUED | OUTPATIENT
Start: 2024-10-15 | End: 2024-10-15 | Stop reason: HOSPADM

## 2024-10-15 RX ORDER — DEXTROSE, SODIUM CHLORIDE, SODIUM LACTATE, POTASSIUM CHLORIDE, AND CALCIUM CHLORIDE 5; .6; .31; .03; .02 G/100ML; G/100ML; G/100ML; G/100ML; G/100ML
INJECTION, SOLUTION INTRAVENOUS CONTINUOUS
Status: DISCONTINUED | OUTPATIENT
Start: 2024-10-15 | End: 2024-10-17

## 2024-10-15 RX ORDER — ONDANSETRON 2 MG/ML
4 INJECTION INTRAMUSCULAR; INTRAVENOUS EVERY 6 HOURS PRN
Status: DISCONTINUED | OUTPATIENT
Start: 2024-10-15 | End: 2024-10-18 | Stop reason: HOSPADM

## 2024-10-15 RX ORDER — OXYTOCIN/0.9 % SODIUM CHLORIDE 30/500 ML
340 PLASTIC BAG, INJECTION (ML) INTRAVENOUS CONTINUOUS PRN
Status: DISCONTINUED | OUTPATIENT
Start: 2024-10-15 | End: 2024-10-15 | Stop reason: HOSPADM

## 2024-10-15 RX ORDER — HYDROCORTISONE 25 MG/G
CREAM TOPICAL 3 TIMES DAILY PRN
Status: DISCONTINUED | OUTPATIENT
Start: 2024-10-15 | End: 2024-10-18 | Stop reason: HOSPADM

## 2024-10-15 RX ORDER — AMOXICILLIN 250 MG
1 CAPSULE ORAL 2 TIMES DAILY
Status: DISCONTINUED | OUTPATIENT
Start: 2024-10-15 | End: 2024-10-18 | Stop reason: HOSPADM

## 2024-10-15 RX ORDER — PROCHLORPERAZINE MALEATE 10 MG
10 TABLET ORAL EVERY 6 HOURS PRN
Status: DISCONTINUED | OUTPATIENT
Start: 2024-10-15 | End: 2024-10-18 | Stop reason: HOSPADM

## 2024-10-15 RX ORDER — METOCLOPRAMIDE 10 MG/1
10 TABLET ORAL EVERY 6 HOURS PRN
Status: DISCONTINUED | OUTPATIENT
Start: 2024-10-15 | End: 2024-10-18 | Stop reason: HOSPADM

## 2024-10-15 RX ORDER — CEFAZOLIN SODIUM/WATER 2 G/20 ML
2 SYRINGE (ML) INTRAVENOUS SEE ADMIN INSTRUCTIONS
Status: DISCONTINUED | OUTPATIENT
Start: 2024-10-15 | End: 2024-10-15 | Stop reason: HOSPADM

## 2024-10-15 RX ORDER — MISOPROSTOL 200 UG/1
800 TABLET ORAL
Status: DISCONTINUED | OUTPATIENT
Start: 2024-10-15 | End: 2024-10-15 | Stop reason: HOSPADM

## 2024-10-15 RX ORDER — ONDANSETRON 2 MG/ML
4 INJECTION INTRAMUSCULAR; INTRAVENOUS EVERY 6 HOURS PRN
Status: DISCONTINUED | OUTPATIENT
Start: 2024-10-15 | End: 2024-10-16

## 2024-10-15 RX ORDER — BISACODYL 10 MG
10 SUPPOSITORY, RECTAL RECTAL DAILY PRN
Status: DISCONTINUED | OUTPATIENT
Start: 2024-10-17 | End: 2024-10-18 | Stop reason: HOSPADM

## 2024-10-15 RX ORDER — CARBOPROST TROMETHAMINE 250 UG/ML
250 INJECTION, SOLUTION INTRAMUSCULAR
Status: DISCONTINUED | OUTPATIENT
Start: 2024-10-15 | End: 2024-10-15 | Stop reason: HOSPADM

## 2024-10-15 RX ORDER — AZITHROMYCIN 500 MG/5ML
500 INJECTION, POWDER, LYOPHILIZED, FOR SOLUTION INTRAVENOUS
Status: DISCONTINUED | OUTPATIENT
Start: 2024-10-15 | End: 2024-10-15 | Stop reason: HOSPADM

## 2024-10-15 RX ORDER — PROCHLORPERAZINE MALEATE 10 MG
10 TABLET ORAL EVERY 6 HOURS PRN
Status: DISCONTINUED | OUTPATIENT
Start: 2024-10-15 | End: 2024-10-16

## 2024-10-15 RX ORDER — NALBUPHINE HYDROCHLORIDE 10 MG/ML
2.5-5 INJECTION INTRAMUSCULAR; INTRAVENOUS; SUBCUTANEOUS EVERY 6 HOURS PRN
Status: DISCONTINUED | OUTPATIENT
Start: 2024-10-15 | End: 2024-10-16

## 2024-10-15 RX ORDER — OXYCODONE HYDROCHLORIDE 5 MG/1
5 TABLET ORAL EVERY 4 HOURS PRN
Status: DISCONTINUED | OUTPATIENT
Start: 2024-10-15 | End: 2024-10-18 | Stop reason: HOSPADM

## 2024-10-15 RX ORDER — LOPERAMIDE HYDROCHLORIDE 2 MG/1
2 CAPSULE ORAL
Status: DISCONTINUED | OUTPATIENT
Start: 2024-10-15 | End: 2024-10-15 | Stop reason: HOSPADM

## 2024-10-15 RX ORDER — ONDANSETRON 4 MG/1
4 TABLET, ORALLY DISINTEGRATING ORAL EVERY 6 HOURS PRN
Status: DISCONTINUED | OUTPATIENT
Start: 2024-10-15 | End: 2024-10-16

## 2024-10-15 RX ORDER — MORPHINE SULFATE 1 MG/ML
INJECTION, SOLUTION EPIDURAL; INTRATHECAL; INTRAVENOUS
Status: COMPLETED | OUTPATIENT
Start: 2024-10-15 | End: 2024-10-15

## 2024-10-15 RX ORDER — ENOXAPARIN SODIUM 100 MG/ML
40 INJECTION SUBCUTANEOUS EVERY 24 HOURS
Status: DISCONTINUED | OUTPATIENT
Start: 2024-10-15 | End: 2024-10-18 | Stop reason: HOSPADM

## 2024-10-15 RX ORDER — BUPIVACAINE HYDROCHLORIDE 7.5 MG/ML
INJECTION, SOLUTION INTRASPINAL
Status: COMPLETED | OUTPATIENT
Start: 2024-10-15 | End: 2024-10-15

## 2024-10-15 RX ORDER — FENTANYL CITRATE-0.9 % NACL/PF 10 MCG/ML
100 PLASTIC BAG, INJECTION (ML) INTRAVENOUS EVERY 5 MIN PRN
Status: DISCONTINUED | OUTPATIENT
Start: 2024-10-15 | End: 2024-10-18 | Stop reason: HOSPADM

## 2024-10-15 RX ORDER — FENTANYL CITRATE 50 UG/ML
INJECTION, SOLUTION INTRAMUSCULAR; INTRAVENOUS
Status: COMPLETED | OUTPATIENT
Start: 2024-10-15 | End: 2024-10-15

## 2024-10-15 RX ORDER — OXYTOCIN/0.9 % SODIUM CHLORIDE 30/500 ML
PLASTIC BAG, INJECTION (ML) INTRAVENOUS PRN
Status: DISCONTINUED | OUTPATIENT
Start: 2024-10-15 | End: 2024-10-15

## 2024-10-15 RX ORDER — SODIUM CHLORIDE, SODIUM LACTATE, POTASSIUM CHLORIDE, CALCIUM CHLORIDE 600; 310; 30; 20 MG/100ML; MG/100ML; MG/100ML; MG/100ML
INJECTION, SOLUTION INTRAVENOUS CONTINUOUS PRN
Status: DISCONTINUED | OUTPATIENT
Start: 2024-10-15 | End: 2024-10-15

## 2024-10-15 RX ORDER — OXYTOCIN/0.9 % SODIUM CHLORIDE 30/500 ML
340 PLASTIC BAG, INJECTION (ML) INTRAVENOUS CONTINUOUS PRN
Status: DISCONTINUED | OUTPATIENT
Start: 2024-10-15 | End: 2024-10-18 | Stop reason: HOSPADM

## 2024-10-15 RX ADMIN — ACETAMINOPHEN 325MG 975 MG: 325 TABLET ORAL at 15:16

## 2024-10-15 RX ADMIN — ACETAMINOPHEN 1000 MG: 500 TABLET, FILM COATED ORAL at 09:00

## 2024-10-15 RX ADMIN — SODIUM CHLORIDE, POTASSIUM CHLORIDE, SODIUM LACTATE AND CALCIUM CHLORIDE: 600; 310; 30; 20 INJECTION, SOLUTION INTRAVENOUS at 09:11

## 2024-10-15 RX ADMIN — OXYCODONE HYDROCHLORIDE 5 MG: 5 TABLET ORAL at 20:19

## 2024-10-15 RX ADMIN — PENICILLIN G 3 MILLION UNITS: 3000000 INJECTION, SOLUTION INTRAVENOUS at 07:16

## 2024-10-15 RX ADMIN — FENTANYL CITRATE 15 MCG: 50 INJECTION INTRAMUSCULAR; INTRAVENOUS at 09:13

## 2024-10-15 RX ADMIN — BUPIVACAINE HYDROCHLORIDE IN DEXTROSE 1.6 ML: 7.5 INJECTION, SOLUTION SUBARACHNOID at 09:13

## 2024-10-15 RX ADMIN — SODIUM CITRATE AND CITRIC ACID MONOHYDRATE 30 ML: 500; 334 SOLUTION ORAL at 09:01

## 2024-10-15 RX ADMIN — OXYCODONE HYDROCHLORIDE 5 MG: 5 TABLET ORAL at 14:20

## 2024-10-15 RX ADMIN — SODIUM CHLORIDE, POTASSIUM CHLORIDE, SODIUM LACTATE AND CALCIUM CHLORIDE 500 ML: 600; 310; 30; 20 INJECTION, SOLUTION INTRAVENOUS at 07:50

## 2024-10-15 RX ADMIN — ENOXAPARIN SODIUM 40 MG: 40 INJECTION SUBCUTANEOUS at 22:16

## 2024-10-15 RX ADMIN — Medication 500 ML: at 09:42

## 2024-10-15 RX ADMIN — ONDANSETRON 4 MG: 2 INJECTION INTRAMUSCULAR; INTRAVENOUS at 09:05

## 2024-10-15 RX ADMIN — Medication 0.2 MG: at 09:13

## 2024-10-15 RX ADMIN — ACETAMINOPHEN 325MG 975 MG: 325 TABLET ORAL at 22:01

## 2024-10-15 RX ADMIN — SODIUM CHLORIDE, POTASSIUM CHLORIDE, SODIUM LACTATE AND CALCIUM CHLORIDE 250 ML: 600; 310; 30; 20 INJECTION, SOLUTION INTRAVENOUS at 00:20

## 2024-10-15 RX ADMIN — Medication 2 G: at 09:14

## 2024-10-15 RX ADMIN — PHENYLEPHRINE HYDROCHLORIDE 0.3 MCG/KG/MIN: 10 INJECTION INTRAVENOUS at 09:17

## 2024-10-15 RX ADMIN — PENICILLIN G 3 MILLION UNITS: 3000000 INJECTION, SOLUTION INTRAVENOUS at 03:14

## 2024-10-15 RX ADMIN — CALCIUM CARBONATE (ANTACID) CHEW TAB 500 MG 500 MG: 500 CHEW TAB at 15:24

## 2024-10-15 ASSESSMENT — ACTIVITIES OF DAILY LIVING (ADL)
ADLS_ACUITY_SCORE: 18

## 2024-10-15 NOTE — PLAN OF CARE
"Goal Outcome Evaluation:      Plan of Care Reviewed With: patient    Overall Patient Progress: improvingOverall Patient Progress: improving    Data: VSS. Postpartum checks within normal limits - see flow record. Patient is eating and drinking normally, and ambulating independently. Antoine removed at 1700, due to void by 2100. Patient is pumping every 2-3 hours. Incision WNL, using Ice pack for discomfort.   Action: Patient medicated with Tylenol and oxycodone during the shift for pain. See MAR. Patient education done, see flow record.  Response: Ipad at bedside, visiting baby in NICU. father at bedside, supportive.  Plan:  Anticipate discharge in 2-3 days.       Problem: Adult Inpatient Plan of Care  Goal: Plan of Care Review  Description: The Plan of Care Review/Shift note should be completed every shift.  The Outcome Evaluation is a brief statement about your assessment that the patient is improving, declining, or no change.  This information will be displayed automatically on your shift  note.  Outcome: Progressing  Flowsheets (Taken 10/15/2024 9173)  Plan of Care Reviewed With: patient  Overall Patient Progress: improving  Goal: Patient-Specific Goal (Individualized)  Description: You can add care plan individualizations to a care plan. Examples of Individualization might be:  \"Parent requests to be called daily at 9am for status\", \"I have a hard time hearing out of my right ear\", or \"Do not touch me to wake me up as it startles  me\".  Outcome: Progressing  Goal: Absence of Hospital-Acquired Illness or Injury  Outcome: Progressing  Goal: Optimal Comfort and Wellbeing  Outcome: Progressing  Goal: Readiness for Transition of Care  Outcome: Progressing     Problem:  Labor  Goal: Delayed  Delivery  Outcome: Progressing         "

## 2024-10-15 NOTE — PROVIDER NOTIFICATION
10/15/24 0741   Provider Notification   Provider Name/Title Dr. Gomez   Method of Notification Electronic Page   Request Evaluate in Person   Notification Reason Decels     Dr. Gomez paged via latosha   RN requesting MD to come bedside to assess. Patient has had two significant decels in the last 20 min, overall pattern for the last 60 min has been Cat 2.

## 2024-10-15 NOTE — CARE PLAN
Dr. Gomez at bedside to discuss MFM recommendation to proceed with delivery now via C/S. Patient consents. RN to begin with pre-procedure prep now plan for case start time of 0900. Dr. Gomez will place orders now.

## 2024-10-15 NOTE — ANESTHESIA CARE TRANSFER NOTE
Patient: Beverly Pacheco    Procedure: Procedure(s):   section       Diagnosis: 34 weeks gestation of pregnancy [Z3A.34]  Diagnosis Additional Information: No value filed.    Anesthesia Type:   Spinal     Note:    Oropharynx: oropharynx clear of all foreign objects  Level of Consciousness: awake  Oxygen Supplementation: room air    Independent Airway: airway patency satisfactory and stable  Dentition: dentition unchanged  Vital Signs Stable: post-procedure vital signs reviewed and stable  Report to RN Given: handoff report given  Patient transferred to: Labor and Delivery    Handoff Report: Identifed the Patient, Identified the Reponsible Provider, Reviewed the pertinent medical history, Discussed the surgical course, Reviewed Intra-OP anesthesia mangement and issues during anesthesia, Set expectations for post-procedure period and Allowed opportunity for questions and acknowledgement of understanding  Vitals:  Vitals Value Taken Time   BP     Temp     Pulse     Resp     SpO2         Electronically Signed By: MARIZA Fuentes CRNA  October 15, 2024  10:29 AM

## 2024-10-15 NOTE — ANESTHESIA POSTPROCEDURE EVALUATION
Patient: Beverly Pacheco    Procedure: Procedure(s):   section       Anesthesia Type:  Spinal    Note:  Disposition: Inpatient   Postop Pain Control: Uneventful            Sign Out: Well controlled pain   PONV: No   Neuro/Psych: Uneventful            Sign Out: Acceptable/Baseline neuro status   Airway/Respiratory: Uneventful            Sign Out: Acceptable/Baseline resp. status   CV/Hemodynamics: Uneventful            Sign Out: Acceptable CV status; No obvious hypovolemia; No obvious fluid overload   Other NRE:    DID A NON-ROUTINE EVENT OCCUR? No           Last vitals:  Vitals Value Taken Time   /55 10/15/24 1101   Temp 97.7  F (36.5  C) 10/15/24 1050   Pulse     Resp     SpO2 97 % 10/15/24 1113   Vitals shown include unfiled device data.    Electronically Signed By: Carol Perez MD  October 15, 2024  11:17 AM
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
## 2024-10-15 NOTE — PLAN OF CARE
Data: Beverly Pacheco transferred to [facility] via cart at 1345. Baby transferred to the NICU before this pt's transfer by another health care provider (34 weeks gestation).  Action: Receiving unit notified of transfer: Yes. Patient and family notified of room change. Report given to JOHN Crawford at 1350. Belongings sent to receiving unit. Accompanied by Registered Nurse. Oriented patient to surroundings. Call light within reach.  Response: Patient tolerated transfer and is stable.    Pt has visited NICU and bonded with baby before initial transfer to PP unit by this writer, pumped about 40 ml of EBM and delivered to NICU, milk labels are printed.

## 2024-10-15 NOTE — PROGRESS NOTES
Public Health Nurse (PHN) spoke with patient regarding [facility] services and resources.  Patient was provided the [facility] Community Resource Guide and Public Health rack cards. Patient accepted referral for home visiting services. [place] Warning given, referral completed electronically.  Patient reports no questions or concerns at this time.

## 2024-10-15 NOTE — ANESTHESIA PREPROCEDURE EVALUATION
"Anesthesia Pre-Procedure Evaluation    Patient: Beverly Pacheco   MRN: [id number] : 2000        Procedure : Procedure(s):   section          Past Medical History:   Diagnosis Date    Depressive disorder       Past Surgical History:   Procedure Laterality Date    LUMPECTOMY BREAST        Allergies   Allergen Reactions    Banana Other (See Comments) and Rash     Migraine Headache    Migraine Headache   Migraine Headache    Ibuprofen GI Disturbance      Social History     Tobacco Use    Smoking status: Never    Smokeless tobacco: Never   Substance Use Topics    Alcohol use: Not Currently      Wt Readings from Last 1 Encounters:   10/12/24 66.2 kg (146 lb)        Anesthesia Evaluation        No history of anesthetic complications       ROS/MED HX  ENT/Pulmonary:  - neg pulmonary ROS     Neurologic:       Cardiovascular:  - neg cardiovascular ROS     METS/Exercise Tolerance:     Hematologic:       Musculoskeletal:       GI/Hepatic:       Renal/Genitourinary:       Endo:       Psychiatric/Substance Use:       Infectious Disease:       Malignancy:       Other:     (-) previous        Physical Exam    Airway        Mallampati: I   TM distance: > 3 FB   Neck ROM: full   Mouth opening: > 3 cm    Respiratory Devices and Support         Dental           Cardiovascular   cardiovascular exam normal          Pulmonary   pulmonary exam normal                OUTSIDE LABS:  CBC:   Lab Results   Component Value Date    WBC 7.1 10/12/2024    HGB 10.8 (L) 10/12/2024    HCT 32.2 (L) 10/12/2024     (L) 10/12/2024     BMP: No results found for: \"NA\", \"POTASSIUM\", \"CHLORIDE\", \"CO2\", \"BUN\", \"CR\", \"GLC\"  COAGS: No results found for: \"PTT\", \"INR\", \"FIBR\"  POC:   Lab Results   Component Value Date    HCG Negative 2023     HEPATIC: No results found for: \"ALBUMIN\", \"PROTTOTAL\", \"ALT\", \"AST\", \"GGT\", \"ALKPHOS\", \"BILITOTAL\", \"BILIDIRECT\", \"KOBE\"  OTHER: No results found for: \"PH\", \"LACT\", \"A1C\", \"TORO\", " "\"PHOS\", \"MAG\", \"LIPASE\", \"AMYLASE\", \"TSH\", \"T4\", \"T3\", \"CRP\", \"SED\"    Anesthesia Plan    ASA Status:  2, emergent       Anesthesia Type: Spinal.              Consents    Anesthesia Plan(s) and associated risks, benefits, and realistic alternatives discussed. Questions answered and patient/representative(s) expressed understanding.     - Discussed:     - Discussed with:  Patient            Postoperative Care            Comments:               Carol Perez MD    I have reviewed the pertinent notes and labs in the chart from the past 30 days and (re)examined the patient.  Any updates or changes from those notes are reflected in this note.                                   "

## 2024-10-15 NOTE — PROVIDER NOTIFICATION
Dr. Delgado updated via phone on current FHR pattern.   Baseline is 160, variability is moderate without 15x15 accels.   FHR showed prolonged decel at 0003 occurring after a contraction lasting for approximately 2min 20 seconds. The Strip is broken during the deceleration so Otoniel is unknown.   Another prolonged deceleration occurred at 0038 lasting 2 min 10 seconds with otoniel down to approximately 130bpm.   Again at 0120 after a contraction, a prolonged decel occurred lasting 2min 10 seconds with otoniel down to 80 bpm.   Patient seems to be judy approximately every 10-15 min palpating mild-moderate. Patient has been repositioned and a 250ml fluid bolus was given.     Dr. Delgado states to continue with interventions and if decelerations begin to occur with 50% or more of contractions to notify and reevaluate.

## 2024-10-15 NOTE — CARE PLAN
Dr. Gomez bedside to discuss persistent decels/ cat 2 tracing. Patient currently in hands and knees with 500ml IV fluid bolus running. Dr. Gomez again discussing possible need for  as infant is not tolerating contractions. Plan for Dr. Gomez to reach out to [facility] and check back in 30 min to discuss findings and make a plan for the day.

## 2024-10-15 NOTE — OP NOTE
[facility]    Section Operative Report    Beverly Pacheco YOB: 2000   MRN [id number] Primary OB: [facility]     PREOPERATIVE DIAGNOSIS  - Intrauterine pregnancy at 34w0d  - Intrauterine growth restriction with elevated Dopplers  - Non-reassuring fetal tracing remote from delivery  - Anxiety/depression  - History of chlamydia this pregnancy, treated  - GBS positive    POSTOPERATIVE DIAGNOSIS  - Same, now delivered    PROCEDURE(S)  Primary low transverse  section via Pfannenstiel incision with double layer uterine closure    SURGEON(S)  Orin Gomez MD     Anesthesia: Spinal  Quantitative Blood Loss: 645mL  IV Fluids: 200mL  Urine Output: 150mL clear yellow  DVT Prophylaxis: SCDs  Antibiotics: 2g Cefazolin + 500mg Azithromycin  Specimens: placenta sent to pathology           INDICATION  Beverly Pacheco is a 24 year old  at 34w0d admitted on 10/12 at 33w4d for  labor.  She was 3 cm on arrival and uncomfortable.  At that time, she was started on betamethasone for fetal lung maturation, nifedipine for tocolysis, and penicillin for unknown GBS status.  A  labor evaluation was performed, which was overall negative aside from a yeast infection that was treated on admission.  Throughout her admission, she remained on continuous fetal monitoring due to intermittent fetal decelerations as well as making some change in her cervix.  On hospital day 3, the fetal tracing worsened and was a persistent category 2 tracing with decelerations and a couple of prolonged decelerations overnight.  The patient and her tracing were discussed with MFM, who recommended delivery in the setting of nonreassuring fetal tracing and the patient being betamethasone complete.  I recommended delivery via primary  section in the setting of nonreassuring fetal tracing remote from delivery and the worry that the baby would not tolerate labor.  This recommendation  was relayed to the patient and she was amenable to proceeding with delivery via primary .  Risks of  section were reviewed with the patient including, but not limited, bleeding, infection, damage to surrounding organs such as the uterus, ovaries, fallopian tubes, bladder, bowels, blood vessels, nerves, and ureters.  Also talked about the risk of blood transfusion, and she accepts blood transfusion if necessary.  Informed consent was obtained and reviewed again prior to the operating room.     FINDINGS  1. Viable female infant at 34w0d.  2. Weight per NICU.  3. APGARs 8 and 9 at 1 and 5 minutes, respectively.  4. Intact placenta with small accessory lobe and 3-vessel cord.   5. No intra-abdominal adhesions.  6. Normal appearing uterus, bilateral fallopian tubes and ovaries.   7. Cord Gases: UA pH 7.34, BD -1.1; UV pH 7.36, BD 1.4.    PROCEDURE DETAILS  The patient was taken to the operating room where adequate spinal anesthesia was achieved without difficulty. The patient was positioned in the dorsal supine position with a leftward tilt and arms outstretched.  The vagina was prepped with Betadine.  A miranda catheter was inserted to drain the bladder. The patient was then prepped and draped in the usual sterile fashion.  She received 2 g of Ancef and 500 mg of azithromycin for surgical prophylaxis.  Surgical timeout was completed.     A Pfannenstiel skin incision was made with a scalpel and carried through sharply and with Bovie cautery to the underlying layer of fascia. The fascia was incised sharply in the midline, and the incision was extended laterally with Carty scissors. The superior edge of the fascia was grasped and elevated with Kocher clamps and the rectus muscles were  from the fascia at the midline bluntly and sharply with Carty scissors to just below the umbilicus and inferiorly to the symphysis. The abdominal cavity was entered bluntly, with care taken to avoid the bladder. The  peritoneum was opened with stretching and the uterus was exposed. The Damian wound protector was inserted for retraction. A bladder flap was not created.     The uterus was scored in a transverse fashion with a scalpel. The uterus was entered at the midline and the incision was extended laterally by stretching cephalocaudally. There was expulsion of clear amniotic fluid. The feal head delivered followed by the shoulders and body onto the maternal abdomen.     The infant was vigorous on delivery.  Delayed cord clamping was performed for 60 seconds. The cord was clamped and cut, and the infant was handed to the awaiting team. Cord gases were obtained. The placenta was removed via fundal massage and cord traction. The uterus was exteriorized and cleared of all debris and blood with dry sponges. The uterus was closed with 0 Vicryl in a running locked fashion, followed by a second imbricating layer.     The uterus was returned to the abdominal cavity.  The Damian wound protector was removed.  The abdominal cavity was cleared of all clots and debris. The uterine incision was inspected in situ and noted to have slow oozing at the left corner of the hysterotomy.  A figure-of-eight suture with 0 Vicryl was placed.  The hysterotomy was reinspected and found to have excellent hemostasis. The subfascial layer was inspected and noted to be hemostatic. The fascia was closed with a running suture of 0 Vicryl.  The subcutaneous layer was irrigated and reapproximated with 3-0 Monocryl in an interrupted fashion.  The skin was closed with 4-0 Monocryl in a subcuticular fashion and covered with Steri-Strips and a sterile dressing.    Instrument, sponge, and needle counts were correct prior the abdominal closure and at the conclusion of the case. The patient tolerated the procedure well and was taken to recovery in stable condition. The infant was transferred to NICU in stable condition.     Complications: None    MD Kia Quinteros  [facility]  10/15/2024 11:01 AM
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
## 2024-10-15 NOTE — PROGRESS NOTES
Brief Update Note    Patient on continuous monitoring throughout the day. Intermittent late and variable decelerations, in between this tracing is reassuring. Cervix unchanged throughout the day    Discussed case with MFM, recommend moving forward with delivery only if making cervical change (I.e. >6cm) or if recurrent decelerations/persistent category II tracing. For now, will continue with IV fluids, repositioning, and continuous monitoring. Plan for formal MFM consult tomorrow.     Discussed the above with the patient. Reviewed goal is to get her baby as much time as possible. Reviewed if tracing becomes more concerning, would recommend moving forward with  delivery and discussed classical vs low transverse uterine incisions. Reviewed urgent vs emergent  deliveries and the anesthesia involved. Beverly expressed understanding. Hoping she will be able to have a vaginal delivery if possible, is very concerned about the possibility of a CS.     Lauren MacNeill, MD [facility]  [phone]  10/14/2024 7:34 PM

## 2024-10-15 NOTE — ANESTHESIA PROCEDURE NOTES
"Intrathecal injection Procedure Note    Pre-Procedure   Staff -        Anesthesiologist:  Carol Perez MD       Performed By: anesthesiologist       Location: OR       Procedure Start/Stop Times: 10/15/2024 9:13 AM and 10/15/2024 9:17 AM       Pre-Anesthestic Checklist: patient identified, IV checked, risks and benefits discussed, informed consent, monitors and equipment checked, pre-op evaluation and at physician/surgeon's request  Timeout:       Correct Patient: Yes        Correct Procedure: Yes        Correct Site: Yes        Correct Position: Yes   Procedure Documentation  Procedure: intrathecal injection       Patient Position: sitting       Skin prep: Chloraprep       Insertion Site: L3-4. (midline approach).       Needle Gauge: 25.        Needle Length (Inches): 3.5        Spinal Needle Type: Pencan       Introducer used       Introducer: 20 G       # of attempts: 1 and  # of redirects:  0    Assessment/Narrative         Paresthesias: No.       Sensory Level: T6       CSF fluid: clear.       Opening pressure was cmH2O while  Sitting.      Medication(s) Administered   0.75% Hyperbaric Bupivacaine (Intrathecal) - Intrathecal   1.6 mL - 10/15/2024 9:13:00 AM  Fentanyl PF (Intrathecal) - Intrathecal   15 mcg - 10/15/2024 9:13:00 AM  Morphine PF 1 mg/mL (Intrathecal) - Intrathecal   0.2 mg - 10/15/2024 9:13:00 AM  Medication Administration Time: 10/15/2024 9:13 AM      FOR [facility] ([facility]) ONLY:   Pain Team Contact information: please page the Pain Team Via [email or web address]. Search \"Pain\". During daytime hours, please page the attending first. At night please page the resident first.      "

## 2024-10-16 LAB
ERYTHROCYTE [DISTWIDTH] IN BLOOD BY AUTOMATED COUNT: 14.1 % (ref 10–15)
HCT VFR BLD AUTO: 32.2 % (ref 35–47)
HGB BLD-MCNC: 10.7 G/DL (ref 11.7–15.7)
MCH RBC QN AUTO: 26.1 PG (ref 26.5–33)
MCHC RBC AUTO-ENTMCNC: 33.2 G/DL (ref 31.5–36.5)
MCV RBC AUTO: 79 FL (ref 78–100)
PATH REPORT.COMMENTS IMP SPEC: NORMAL
PATH REPORT.COMMENTS IMP SPEC: NORMAL
PATH REPORT.FINAL DX SPEC: NORMAL
PATH REPORT.GROSS SPEC: NORMAL
PATH REPORT.MICROSCOPIC SPEC OTHER STN: NORMAL
PATH REPORT.RELEVANT HX SPEC: NORMAL
PHOTO IMAGE: NORMAL
PLATELET # BLD AUTO: 181 10E3/UL (ref 150–450)
RBC # BLD AUTO: 4.1 10E6/UL (ref 3.8–5.2)
WBC # BLD AUTO: 11.7 10E3/UL (ref 4–11)

## 2024-10-16 PROCEDURE — 250N000011 HC RX IP 250 OP 636: Performed by: STUDENT IN AN ORGANIZED HEALTH CARE EDUCATION/TRAINING PROGRAM

## 2024-10-16 PROCEDURE — 250N000013 HC RX MED GY IP 250 OP 250 PS 637: Performed by: STUDENT IN AN ORGANIZED HEALTH CARE EDUCATION/TRAINING PROGRAM

## 2024-10-16 PROCEDURE — 120N000013 HC R&B IMCU

## 2024-10-16 PROCEDURE — 85018 HEMOGLOBIN: CPT | Performed by: STUDENT IN AN ORGANIZED HEALTH CARE EDUCATION/TRAINING PROGRAM

## 2024-10-16 PROCEDURE — 36415 COLL VENOUS BLD VENIPUNCTURE: CPT | Performed by: STUDENT IN AN ORGANIZED HEALTH CARE EDUCATION/TRAINING PROGRAM

## 2024-10-16 RX ORDER — ACETAMINOPHEN 325 MG/1
975 TABLET ORAL EVERY 6 HOURS
Qty: 90 TABLET | Refills: 1 | Status: SHIPPED | OUTPATIENT
Start: 2024-10-16

## 2024-10-16 RX ORDER — AMOXICILLIN 250 MG
1 CAPSULE ORAL DAILY PRN
Qty: 30 TABLET | Refills: 0 | Status: SHIPPED | OUTPATIENT
Start: 2024-10-16

## 2024-10-16 RX ORDER — MODIFIED LANOLIN
OINTMENT (GRAM) TOPICAL
Qty: 7 G | Refills: 3 | Status: SHIPPED | OUTPATIENT
Start: 2024-10-16

## 2024-10-16 RX ORDER — HYDROCORTISONE 25 MG/G
CREAM TOPICAL 3 TIMES DAILY PRN
Qty: 30 G | Refills: 0 | Status: SHIPPED | OUTPATIENT
Start: 2024-10-16

## 2024-10-16 RX ADMIN — OXYCODONE HYDROCHLORIDE 5 MG: 5 TABLET ORAL at 21:45

## 2024-10-16 RX ADMIN — ENOXAPARIN SODIUM 40 MG: 40 INJECTION SUBCUTANEOUS at 21:48

## 2024-10-16 RX ADMIN — ACETAMINOPHEN 325MG 975 MG: 325 TABLET ORAL at 10:55

## 2024-10-16 RX ADMIN — ACETAMINOPHEN 325MG 975 MG: 325 TABLET ORAL at 16:12

## 2024-10-16 RX ADMIN — OXYCODONE HYDROCHLORIDE 5 MG: 5 TABLET ORAL at 00:14

## 2024-10-16 RX ADMIN — OXYCODONE HYDROCHLORIDE 5 MG: 5 TABLET ORAL at 08:57

## 2024-10-16 RX ADMIN — ACETAMINOPHEN 325MG 975 MG: 325 TABLET ORAL at 22:28

## 2024-10-16 RX ADMIN — SENNOSIDES AND DOCUSATE SODIUM 1 TABLET: 50; 8.6 TABLET ORAL at 08:57

## 2024-10-16 RX ADMIN — SIMETHICONE 80 MG: 80 TABLET, CHEWABLE ORAL at 10:58

## 2024-10-16 RX ADMIN — OXYCODONE HYDROCHLORIDE 5 MG: 5 TABLET ORAL at 04:24

## 2024-10-16 RX ADMIN — SERTRALINE HYDROCHLORIDE 50 MG: 50 TABLET ORAL at 08:36

## 2024-10-16 RX ADMIN — OXYCODONE HYDROCHLORIDE 5 MG: 5 TABLET ORAL at 16:12

## 2024-10-16 RX ADMIN — ACETAMINOPHEN 325MG 975 MG: 325 TABLET ORAL at 04:24

## 2024-10-16 RX ADMIN — SENNOSIDES AND DOCUSATE SODIUM 1 TABLET: 50; 8.6 TABLET ORAL at 21:45

## 2024-10-16 RX ADMIN — SIMETHICONE 80 MG: 80 TABLET, CHEWABLE ORAL at 21:45

## 2024-10-16 ASSESSMENT — ACTIVITIES OF DAILY LIVING (ADL)
ADLS_ACUITY_SCORE: 18
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
## 2024-10-16 NOTE — PLAN OF CARE
"Goal Outcome Evaluation:      Plan of Care Reviewed With: patient    Overall Patient Progress: improvingOverall Patient Progress: improving    Outcome Evaluation: Fundal exams and vital signs have been WNL. Patient has been up independently, eating/drinking, and voiding appropriately following catheter removal. IV saline locked. Lovenox started this evening. Incisional pain controlled with tylenol and oxycodone (see MAR). Incision is covered with island dressing and marked - no new drainage. Pumping q 2.5-3 hours. Has been able to visit infant in NICU overnight and has ipad set up in room. No support person staying with patient overnight. Social work consult completed on 10/14.      Problem: Adult Inpatient Plan of Care  Goal: Plan of Care Review  Description: The Plan of Care Review/Shift note should be completed every shift.  The Outcome Evaluation is a brief statement about your assessment that the patient is improving, declining, or no change.  This information will be displayed automatically on your shift  note.  Outcome: Progressing  Flowsheets (Taken 10/16/2024 0621)  Outcome Evaluation: Fundal exams and vital signs have been WNL. Patient has been up independently, eating/drinking, and voiding appropriately following catheter removal. IV saline locked. Lovenox started this evening. Incisional pain controlled with tylenol and oxycodone (see MAR). Incision is covered with island dressing and marked - no new drainage. Pumping q 2.5-3 hours. Has been able to visit infant in NICU overnight and has ipad set up in room. No support person staying with patient overnight. Social work consult completed on 10/14.  Plan of Care Reviewed With: patient  Overall Patient Progress: improving  Goal: Patient-Specific Goal (Individualized)  Description: You can add care plan individualizations to a care plan. Examples of Individualization might be:  \"Parent requests to be called daily at 9am for status\", \"I have a hard time " "hearing out of my right ear\", or \"Do not touch me to wake me up as it startles  me\".  Outcome: Progressing  Goal: Absence of Hospital-Acquired Illness or Injury  Outcome: Progressing  Intervention: Prevent and Manage VTE (Venous Thromboembolism) Risk  Recent Flowsheet Documentation  Taken 10/15/2024 2015 by Katty cAe, RN  VTE Prevention/Management: (pt ambulating and drinking fluids)   SCDs on (sequential compression devices)   other (see comments)  Intervention: Prevent Infection  Recent Flowsheet Documentation  Taken 10/15/2024 2015 by Katty Ace, RN  Infection Prevention:   single patient room provided   rest/sleep promoted   hand hygiene promoted  Goal: Optimal Comfort and Wellbeing  Outcome: Progressing  Intervention: Monitor Pain and Promote Comfort  Recent Flowsheet Documentation  Taken 10/16/2024 0014 by Katty Ace RN  Pain Management Interventions:   rest   quiet environment facilitated  Intervention: Provide Person-Centered Care  Recent Flowsheet Documentation  Taken 10/15/2024 2015 by Katty Ace, RN  Trust Relationship/Rapport:   care explained   choices provided   emotional support provided   empathic listening provided   questions answered   questions encouraged   reassurance provided   thoughts/feelings acknowledged  Goal: Readiness for Transition of Care  Outcome: Progressing     Problem:  Labor  Goal: Delayed  Delivery  Outcome: Progressing         "

## 2024-10-16 NOTE — PLAN OF CARE
VSS on room air. Fundus/lochia/incision WDL, small areas of dried drainage on dressing that are marked. Voiding appropriately and ambulating independently. Pain adequately controlled with scheduled and PRN medications. Pumping and bringing milk down to infant in NICU. HasPositive bonding and attachment with infant observed.     Goal Outcome Evaluation:      Plan of Care Reviewed With: patient    Overall Patient Progress: improving    Problem: Adult Inpatient Plan of Care  Goal: Absence of Hospital-Acquired Illness or Injury  Outcome: Progressing  Intervention: Prevent Skin Injury  Recent Flowsheet Documentation  Taken 10/16/2024 1601 by Kinjal Burks RN  Body Position: position changed independently  Intervention: Prevent and Manage VTE (Venous Thromboembolism) Risk  Recent Flowsheet Documentation  Taken 10/16/2024 1601 by Kinjal Burks RN  VTE Prevention/Management: SCDs off (sequential compression devices)  Intervention: Prevent Infection  Recent Flowsheet Documentation  Taken 10/16/2024 160 by Kinjal Burks RN  Infection Prevention:   rest/sleep promoted   hand hygiene promoted   single patient room provided    Problem: Adult Inpatient Plan of Care  Goal: Readiness for Transition of Care  Outcome: Progressing     Problem: Postpartum ( Delivery)  Goal: Successful Parent Role Transition  Outcome: Progressing  Intervention: Support Parent Role Transition  Recent Flowsheet Documentation  Taken 10/16/2024 1601 by Kinjal Burks RN  Supportive Measures:   active listening utilized   counseling provided   decision-making supported   goal-setting facilitated   positive reinforcement provided   verbalization of feelings encouraged   self-responsibility promoted   self-care encouraged

## 2024-10-16 NOTE — PLAN OF CARE
Patient's miranda removed at 1700 and due to void by 2100. Patient in NICU visiting baby from [id number]. After return to postpartum room, assessment/fundal exam/VS within normal limits. Time for patient to pump now. Will ambulate to bathroom and attempt to void after she's finished pumping. Patient has been hydrating with oral fluids. IV saline locked.

## 2024-10-16 NOTE — PLAN OF CARE
"Goal Outcome Evaluation:      Plan of Care Reviewed With: patient    Overall Patient Progress: improvingOverall Patient Progress: improving    Outcome Evaluation: VSS, pain managed, bonding with infant in NICU. pumping.    Patient meeting expected goals. Is up independent in room, meeting all personal needs. VSS. Pain is being managed with Tylenol and Oxycodone. Voiding without difficulty. Incision to low abdomen is with island dressing, with scant dried drainage and no signs of infection noted to surrounding tissue. Mother to patient at bedside; very supportive. FOB; Eduardo, is now here. Patient became anxious (shaking arms, mouth, chin; but smiling) when knowing FOB would be coming up soon and states, \"I get like this when I am anxious\". Reminded patient of safe word and patient verbalized understanding. Patient aware to call out for any assistance. Encouraged frequent pumping. Plans to visit the NICU soon.    Problem: Adult Inpatient Plan of Care  Goal: Plan of Care Review  Description: The Plan of Care Review/Shift note should be completed every shift.  The Outcome Evaluation is a brief statement about your assessment that the patient is improving, declining, or no change.  This information will be displayed automatically on your shift  note.  Outcome: Progressing  Flowsheets (Taken 10/16/2024 0930)  Outcome Evaluation: VSS, pain managed, bonding with infant in NICU. pumping.  Plan of Care Reviewed With: patient  Overall Patient Progress: improving  Goal: Patient-Specific Goal (Individualized)  Description: You can add care plan individualizations to a care plan. Examples of Individualization might be:  \"Parent requests to be called daily at 9am for status\", \"I have a hard time hearing out of my right ear\", or \"Do not touch me to wake me up as it startles  me\".  Outcome: Progressing  Goal: Absence of Hospital-Acquired Illness or Injury  Outcome: Progressing  Intervention: Prevent Skin Injury  Recent Flowsheet " Documentation  Taken 10/16/2024 0900 by Gianna Clarke RN  Body Position: position changed independently  Taken 10/16/2024 0857 by Gianna Clarke RN  Body Position: position changed independently  Intervention: Prevent Infection  Recent Flowsheet Documentation  Taken 10/16/2024 0900 by Gianna Clarke RN  Infection Prevention:   rest/sleep promoted   hand hygiene promoted   single patient room provided  Goal: Optimal Comfort and Wellbeing  Outcome: Progressing  Intervention: Monitor Pain and Promote Comfort  Recent Flowsheet Documentation  Taken 10/16/2024 0857 by Gianna Clarke RN  Pain Management Interventions:   quiet environment facilitated   pillow support provided   pain management plan reviewed with patient/caregiver   ambulation/increased activity   cold applied  Intervention: Provide Person-Centered Care  Recent Flowsheet Documentation  Taken 10/16/2024 0900 by Gianna Clarke RN  Trust Relationship/Rapport:   care explained   choices provided   emotional support provided   empathic listening provided   questions answered   questions encouraged   reassurance provided   thoughts/feelings acknowledged  Goal: Readiness for Transition of Care  Outcome: Progressing     Problem: Postpartum ( Delivery)  Goal: Successful Parent Role Transition  Outcome: Progressing  Intervention: Support Parent Role Transition  Recent Flowsheet Documentation  Taken 10/16/2024 0900 by Gianna Clarke RN  Supportive Measures:   active listening utilized   counseling provided   decision-making supported   goal-setting facilitated   positive reinforcement provided   verbalization of feelings encouraged   self-responsibility promoted   self-care encouraged  Goal: Hemostasis  Outcome: Progressing  Goal: Effective Bowel Elimination  Outcome: Progressing  Goal: Fluid and Electrolyte Balance  Outcome: Progressing  Goal: Absence of Infection Signs and Symptoms  Outcome:  Progressing  Goal: Anesthesia/Sedation Recovery  Outcome: Progressing  Goal: Optimal Pain Control and Function  Outcome: Progressing  Intervention: Prevent or Manage Pain  Recent Flowsheet Documentation  Taken 10/16/2024 0900 by Gianna Clarke RN  Perineal Care: absorbent brief/pad changed  Taken 10/16/2024 0857 by Gianna Clarke RN  Pain Management Interventions:   quiet environment facilitated   pillow support provided   pain management plan reviewed with patient/caregiver   ambulation/increased activity   cold applied  Perineal Care: absorbent brief/pad changed  Goal: Nausea and Vomiting Relief  Outcome: Progressing  Goal: Effective Urinary Elimination  Outcome: Progressing  Goal: Effective Oxygenation and Ventilation  Outcome: Progressing  Intervention: Optimize Oxygenation and Ventilation  Recent Flowsheet Documentation  Taken 10/16/2024 0900 by Gianna Clarke, RN  Head of Bed (HOB) Positioning: HOB at 60-90 degrees  Taken 10/16/2024 0857 by Gianna Clarke, RN  Head of Bed (HOB) Positioning: HOB at 60-90 degrees

## 2024-10-17 PROCEDURE — 120N000013 HC R&B IMCU

## 2024-10-17 PROCEDURE — 250N000013 HC RX MED GY IP 250 OP 250 PS 637: Performed by: STUDENT IN AN ORGANIZED HEALTH CARE EDUCATION/TRAINING PROGRAM

## 2024-10-17 PROCEDURE — 250N000013 HC RX MED GY IP 250 OP 250 PS 637: Performed by: OBSTETRICS & GYNECOLOGY

## 2024-10-17 PROCEDURE — 90471 IMMUNIZATION ADMIN: CPT | Performed by: STUDENT IN AN ORGANIZED HEALTH CARE EDUCATION/TRAINING PROGRAM

## 2024-10-17 PROCEDURE — 90707 MMR VACCINE SC: CPT | Performed by: STUDENT IN AN ORGANIZED HEALTH CARE EDUCATION/TRAINING PROGRAM

## 2024-10-17 PROCEDURE — 250N000011 HC RX IP 250 OP 636: Performed by: STUDENT IN AN ORGANIZED HEALTH CARE EDUCATION/TRAINING PROGRAM

## 2024-10-17 RX ORDER — LIDOCAINE 4 G/G
2 PATCH TOPICAL
Status: DISCONTINUED | OUTPATIENT
Start: 2024-10-17 | End: 2024-10-18 | Stop reason: HOSPADM

## 2024-10-17 RX ADMIN — SERTRALINE HYDROCHLORIDE 50 MG: 50 TABLET ORAL at 08:21

## 2024-10-17 RX ADMIN — LIDOCAINE 2 PATCH: 4 PATCH TOPICAL at 12:33

## 2024-10-17 RX ADMIN — OXYCODONE HYDROCHLORIDE 5 MG: 5 TABLET ORAL at 19:53

## 2024-10-17 RX ADMIN — ENOXAPARIN SODIUM 40 MG: 40 INJECTION SUBCUTANEOUS at 23:41

## 2024-10-17 RX ADMIN — OXYCODONE HYDROCHLORIDE 5 MG: 5 TABLET ORAL at 01:47

## 2024-10-17 RX ADMIN — MEASLES, MUMPS, AND RUBELLA VIRUS VACCINE LIVE 0.5 ML: 1000; 12500; 1000 INJECTION, POWDER, LYOPHILIZED, FOR SUSPENSION SUBCUTANEOUS at 12:32

## 2024-10-17 RX ADMIN — OXYCODONE HYDROCHLORIDE 5 MG: 5 TABLET ORAL at 12:24

## 2024-10-17 RX ADMIN — OXYCODONE HYDROCHLORIDE 5 MG: 5 TABLET ORAL at 06:46

## 2024-10-17 RX ADMIN — ACETAMINOPHEN 325MG 975 MG: 325 TABLET ORAL at 12:24

## 2024-10-17 RX ADMIN — ACETAMINOPHEN 325MG 975 MG: 325 TABLET ORAL at 19:53

## 2024-10-17 RX ADMIN — ACETAMINOPHEN 325MG 975 MG: 325 TABLET ORAL at 04:33

## 2024-10-17 ASSESSMENT — ACTIVITIES OF DAILY LIVING (ADL)
ADLS_ACUITY_SCORE: 18

## 2024-10-17 NOTE — LACTATION NOTE
Lactation Visit: Writer went down with  pt to NICU to observe a feeding. Infant was born at 34.0 weeks gestation but has showed interest at the breast. Infant was able to latch on well and had a rhythmic suck. Swallows were heard with breast compression only. Infant tolerated feeding for approximately 10 mins and then rested on mothers chest. Infant has been to the breast 3 times per the NICU nurse. Discussed with mother  infants and some of the difficulties they can have at the breast and the importance of continuing to pump every 3 hours to ensure adequate stimulation as well as increasing her milk supply. Mother feels like her milk may be coming in as she was able to pump 15-20ml. Did not need to use a shield with this feeding. Lactation to follow-up

## 2024-10-17 NOTE — PLAN OF CARE
"Pt VSS. Postpartum checks WDL. C/s incision has scant drainage, no change. Is bonding well with infant. Tolerating regular diet and able to ambulate independently. Urine output adequate, voids without difficulty.  Pt utilizing tylenol and oxycodone for pain management. Pumping for  in NICU.    Problem: Adult Inpatient Plan of Care  Goal: Plan of Care Review  Description: The Plan of Care Review/Shift note should be completed every shift.  The Outcome Evaluation is a brief statement about your assessment that the patient is improving, declining, or no change.  This information will be displayed automatically on your shift  note.  Outcome: Progressing  Flowsheets (Taken 10/17/2024 0606)  Plan of Care Reviewed With: patient  Overall Patient Progress: improving  Goal: Patient-Specific Goal (Individualized)  Description: You can add care plan individualizations to a care plan. Examples of Individualization might be:  \"Parent requests to be called daily at 9am for status\", \"I have a hard time hearing out of my right ear\", or \"Do not touch me to wake me up as it startles  me\".  Outcome: Progressing  Goal: Absence of Hospital-Acquired Illness or Injury  Outcome: Progressing  Intervention: Prevent Skin Injury  Recent Flowsheet Documentation  Taken 10/17/2024 0400 by Jody Yoon RN  Body Position: position changed independently  Goal: Optimal Comfort and Wellbeing  Outcome: Progressing  Intervention: Provide Person-Centered Care  Recent Flowsheet Documentation  Taken 10/17/2024 0400 by Jody Yoon RN  Trust Relationship/Rapport:   care explained   choices provided   questions answered   questions encouraged   thoughts/feelings acknowledged  Goal: Readiness for Transition of Care  Outcome: Progressing     Problem: Postpartum ( Delivery)  Goal: Successful Parent Role Transition  Outcome: Progressing  Goal: Hemostasis  Outcome: Progressing  Goal: Effective Bowel Elimination  Outcome: Progressing  Goal: " Fluid and Electrolyte Balance  Outcome: Progressing  Goal: Absence of Infection Signs and Symptoms  Outcome: Progressing  Goal: Anesthesia/Sedation Recovery  Outcome: Progressing  Goal: Optimal Pain Control and Function  Outcome: Progressing  Goal: Nausea and Vomiting Relief  Outcome: Progressing  Goal: Effective Urinary Elimination  Outcome: Progressing  Goal: Effective Oxygenation and Ventilation  Outcome: Progressing  Intervention: Optimize Oxygenation and Ventilation  Recent Flowsheet Documentation  Taken 10/17/2024 0400 by Jody Yoon RN  Head of Bed (HOB) Positioning: HOB at 20-30 degrees
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
## 2024-10-17 NOTE — PLAN OF CARE
Vital signs WDL. Postpartum checks WDL. Scant lochia, denies clots. Denies pre-eclampsia symptoms. Pumping every 2-3 hours. Independent with self cares, frequently visits infant in NICU. Pain adequately managed with oxycodone, tylenol. Voiding adequately. Up ad melba.

## 2024-10-17 NOTE — DISCHARGE INSTRUCTIONS
Warning Signs after Having a Baby    Keep this paper on your fridge or somewhere else where you can see it.    Call your provider if you have any of these symptoms up to 12 weeks after having your baby.    Thoughts of hurting yourself or your baby  Pain in your chest or trouble breathing  Severe headache not helped by pain medicine  Eyesight concerns (blurry vision, seeing spots or flashes of light, other changes to eyesight)  Fainting, shaking or other signs of a seizure    Call 9-1-1 if you feel that it is an emergency.     The symptoms below can happen to anyone after giving birth. They can be very serious. Call your provider if you have any of these warning signs.    My provider s phone number: _______________________    Losing too much blood (hemorrhage)    Call your provider if you soak through a pad in less than an hour or pass blood clots bigger than a golf ball. These may be signs that you are bleeding too much.    Blood clots in the legs or lungs    After you give birth, your body naturally clots its blood to help prevent blood loss. Sometimes this increased clotting can happen in other areas of the body, like the legs or lungs. This can block your blood flow and be very dangerous.     Call your provider if you:  Have a red, swollen spot on the back of your leg that is warm or painful when you touch it.   Are coughing up blood.     Infection    Call your provider if you have any of these symptoms:  Fever of 100.4 F (38 C) or higher.  Pain or redness around your stitches if you had an incision.   Any yellow, white, or green fluid coming from places where you had stitches or surgery.    Mood Problems (postpartum depression)    Many people feel sad or have mood changes after having a baby. But for some people, these mood swings are worse.     Call your provider right away if you feel so anxious or nervous that you can't care for yourself or your baby.    Preeclampsia (high blood pressure)    Even if you  didn't have high blood pressure when you were pregnant, you are at risk for the high blood pressure disease called preeclampsia. This risk can last up to 12 weeks after giving birth.     Call your provider if you have:   Pain on your right side under your rib cage  Sudden swelling in the hands and face    Remember: You know your body. If something doesn't feel right, get medical help.     For informational purposes only. Not to replace the advice of your health care provider. Copyright 2020 [facility]. All rights reserved. Clinically reviewed by Sylvia Pena, RNC-OB, MSN. [employer] [id number] - Rev .     Section: What to Expect at Home  Your Recovery     A  section, or , is surgery to deliver your baby through a cut that the doctor makes in your lower belly and uterus. The cut is called an incision.  You may have some pain in your lower belly and need pain medicine for 1 to 2 weeks. You can expect some vaginal bleeding for several weeks. You will probably need about 6 weeks to fully recover.  It's important to take it easy while the incision heals. Avoid heavy lifting, strenuous activities, and exercises that strain the belly muscles while you recover. Ask a family member or friend for help with housework, cooking, and shopping.  This care sheet gives you a general idea about how long it will take for you to recover. But each person recovers at a different pace. Follow the steps below to get better as quickly as possible.  How can you care for yourself at home?  Activity    Rest when you feel tired. Getting enough sleep will help you recover.     Try to walk each day. Start by walking a little more than you did the day before. Bit by bit, increase the amount you walk. Walking boosts blood flow and helps prevent pneumonia, constipation, and blood clots.     Avoid strenuous activities, such as bicycle riding, jogging, weightlifting, and aerobic exercise, for 6 weeks or until  your doctor says it is okay.     Until your doctor says it is okay, do not lift anything heavier than your baby.     Do not do sit-ups or other exercises that strain the belly muscles for 6 weeks or until your doctor says it is okay.     Hold a pillow over your incision when you cough or take deep breaths. This will support your belly and decrease your pain.     You may shower as usual. Pat the incision dry when you are done.     You will have some vaginal bleeding. Wear sanitary pads. Do not douche or use tampons until your doctor says it is okay.     Ask your doctor when you can drive again.     You will probably need to take at least 6 weeks off work. It depends on the type of work you do and how you feel.     Ask your doctor when it is okay for you to have sex.   Diet    You can eat your normal diet. If your stomach is upset, try bland, low-fat foods like plain rice, broiled chicken, toast, and yogurt.     Drink plenty of fluids (unless your doctor tells you not to).     You may notice that your bowel movements are not regular right after your surgery. This is common. Try to avoid constipation and straining with bowel movements. You may want to take a fiber supplement every day. If you have not had a bowel movement after a couple of days, ask your doctor about taking a mild laxative.     If you are breastfeeding, limit alcohol. Alcohol can cause a lack of energy and other health problems for the baby when a breastfeeding woman drinks heavily. It can also get in the way of a mom's ability to feed her baby or to care for the child in other ways. There isn't a lot of research about exactly how much alcohol can harm a baby. Having no alcohol is the safest choice for your baby. If you choose to have a drink now and then, have only one drink, and limit the number of occasions that you have a drink. Wait to breastfeed at least 2 hours after you have a drink to reduce the amount of alcohol the baby may get in the milk.    Medicines    Your doctor will tell you if and when you can restart your medicines. You will also get instructions about taking any new medicines.     If you stopped taking aspirin or some other blood thinner, your doctor will tell you when to start taking it again.     Take pain medicines exactly as directed.  If the doctor gave you a prescription medicine for pain, take it as prescribed.  If you are not taking a prescription pain medicine, ask your doctor if you can take an over-the-counter medicine.     If you think your pain medicine is making you sick to your stomach:  Take your medicine after meals (unless your doctor has told you not to).  Ask your doctor for a different pain medicine.     If your doctor prescribed antibiotics, take them as directed. Do not stop taking them just because you feel better. You need to take the full course of antibiotics.   Incision care    If you have strips of tape on the incision, leave the tape on for a week or until it falls off.     Wash the area daily with warm, soapy water, and pat it dry. Don't use hydrogen peroxide or alcohol, which can slow healing. You may cover the area with a gauze bandage if it weeps or rubs against clothing. Change the bandage every day.     Keep the area clean and dry.   Other instructions    If you breastfeed your baby, you may be more comfortable while you are healing if you don't rest your baby on your belly. Try tucking your baby under your arm, with your baby's body along the side you will be feeding on. Support your baby's upper body with your arm. With that hand you can control your baby's head to bring your baby's mouth to your breast. This is sometimes called the football hold.   Follow-up care is a key part of your treatment and safety. Be sure to make and go to all appointments, and call your doctor if you are having problems. It's also a good idea to know your test results and keep a list of the medicines you take.  When should you  call for help?  Share this information with your partner, family, or a friend. They can help you watch for warning signs.  Call 911  anytime you think you may need emergency care. For example, call if:    You feel you cannot stop from hurting yourself, your baby, or someone else.     You passed out (lost consciousness).     You have chest pain, are short of breath, or cough up blood.     You have a seizure.   Where to get help 24 hours a day, 7 days a week   If you or someone you know talks about suicide, self-harm, a mental health crisis, a substance use crisis, or any other kind of emotional distress, get help right away. You can:    Call the Suicide and Crisis Lifeline at 988.     Call 1-050-487-TALK (1-142.677.9710).     Text HOME to 754114 to access the Crisis Text Line.   Consider saving these numbers in your phone.  Go to [employer] for more information or to chat online.  Call your doctor or midwife now or seek immediate medical care if:    You have loose stitches, or your incision comes open.     You have signs of hemorrhage (too much bleeding), such as:  Heavy vaginal bleeding. This means that you are soaking through one or more pads in an hour. Or you pass blood clots bigger than an egg.  Feeling dizzy or lightheaded, or you feel like you may faint.  Feeling so tired or weak that you cannot do your usual activities.  A fast or irregular heartbeat.  New or worse belly pain.     You have symptoms of infection, such as:  Increased pain, swelling, warmth, or redness.  Red streaks leading from the incision.  Pus draining from the incision.  A fever.  Frequent or painful urination or blood in your urine.  Vaginal discharge that smells bad.  New or worse belly pain.     You have symptoms of a blood clot in your leg (called a deep vein thrombosis), such as:  Pain in the calf, back of the knee, thigh, or groin.  Swelling in the leg or groin.  A color change on the leg or groin. The skin may be reddish or  "purplish, depending on your usual skin color.     You have signs of preeclampsia, such as:  Sudden swelling of your face, hands, or feet.  New vision problems (such as dimness, blurring, or seeing spots).  A severe headache.     You have signs of heart failure, such as:  New or increased shortness of breath.  New or worse swelling in your legs, ankles, or feet.  Sudden weight gain, such as more than 2 to 3 pounds in a day or 5 pounds in a week.  Feeling so tired or weak that you cannot do your usual activities.     You had spinal or epidural pain relief and have:  New or worse back pain.  Increased pain, swelling, warmth, or redness at the injection site.  Tingling, weakness, or numbness in your legs or groin.   Watch closely for changes in your health, and be sure to contact your doctor or midwife if:    Your vaginal bleeding isn't decreasing.     You feel sad, anxious, or hopeless for more than a few days.     You are having problems with your breasts or breastfeeding.   Where can you learn more?  Go to https://www.[email or web address].net/patiented  Enter M806 in the search box to learn more about \" Section: What to Expect at Home.\"  Current as of: July 10, 2023  Content Version: 2024 [facility] [employer].   Care instructions adapted under license by your healthcare professional. If you have questions about a medical condition or this instruction, always ask your healthcare professional. [employer] disclaims any warranty or liability for your use of this information.    "

## 2024-10-18 VITALS
HEIGHT: 62 IN | HEART RATE: 91 BPM | SYSTOLIC BLOOD PRESSURE: 114 MMHG | TEMPERATURE: 98 F | DIASTOLIC BLOOD PRESSURE: 77 MMHG | WEIGHT: 146 LBS | BODY MASS INDEX: 26.87 KG/M2 | RESPIRATION RATE: 18 BRPM | OXYGEN SATURATION: 100 %

## 2024-10-18 LAB
CREAT SERPL-MCNC: 0.51 MG/DL (ref 0.51–0.95)
EGFRCR SERPLBLD CKD-EPI 2021: >90 ML/MIN/1.73M2
PLATELET # BLD AUTO: 262 10E3/UL (ref 150–450)

## 2024-10-18 PROCEDURE — 250N000013 HC RX MED GY IP 250 OP 250 PS 637: Performed by: STUDENT IN AN ORGANIZED HEALTH CARE EDUCATION/TRAINING PROGRAM

## 2024-10-18 PROCEDURE — 82565 ASSAY OF CREATININE: CPT | Performed by: STUDENT IN AN ORGANIZED HEALTH CARE EDUCATION/TRAINING PROGRAM

## 2024-10-18 PROCEDURE — 36415 COLL VENOUS BLD VENIPUNCTURE: CPT | Performed by: STUDENT IN AN ORGANIZED HEALTH CARE EDUCATION/TRAINING PROGRAM

## 2024-10-18 PROCEDURE — 85049 AUTOMATED PLATELET COUNT: CPT | Performed by: STUDENT IN AN ORGANIZED HEALTH CARE EDUCATION/TRAINING PROGRAM

## 2024-10-18 PROCEDURE — 250N000013 HC RX MED GY IP 250 OP 250 PS 637: Performed by: OBSTETRICS & GYNECOLOGY

## 2024-10-18 RX ORDER — LIDOCAINE 4 G/G
2 PATCH TOPICAL EVERY 24 HOURS
Qty: 30 PATCH | Refills: 1 | Status: SHIPPED | OUTPATIENT
Start: 2024-10-18

## 2024-10-18 RX ORDER — OXYCODONE HYDROCHLORIDE 5 MG/1
5 TABLET ORAL EVERY 4 HOURS PRN
Qty: 12 TABLET | Refills: 0 | Status: SHIPPED | OUTPATIENT
Start: 2024-10-18

## 2024-10-18 RX ADMIN — SENNOSIDES AND DOCUSATE SODIUM 2 TABLET: 50; 8.6 TABLET ORAL at 08:30

## 2024-10-18 RX ADMIN — OXYCODONE HYDROCHLORIDE 5 MG: 5 TABLET ORAL at 00:16

## 2024-10-18 RX ADMIN — OXYCODONE HYDROCHLORIDE 5 MG: 5 TABLET ORAL at 04:17

## 2024-10-18 RX ADMIN — ACETAMINOPHEN 325MG 975 MG: 325 TABLET ORAL at 08:30

## 2024-10-18 RX ADMIN — OXYCODONE HYDROCHLORIDE 5 MG: 5 TABLET ORAL at 16:23

## 2024-10-18 RX ADMIN — LIDOCAINE 2 PATCH: 4 PATCH TOPICAL at 08:29

## 2024-10-18 RX ADMIN — OXYCODONE HYDROCHLORIDE 5 MG: 5 TABLET ORAL at 10:49

## 2024-10-18 RX ADMIN — ACETAMINOPHEN 325MG 975 MG: 325 TABLET ORAL at 02:00

## 2024-10-18 RX ADMIN — ACETAMINOPHEN 325MG 975 MG: 325 TABLET ORAL at 16:23

## 2024-10-18 RX ADMIN — SERTRALINE HYDROCHLORIDE 50 MG: 50 TABLET ORAL at 08:30

## 2024-10-18 ASSESSMENT — ACTIVITIES OF DAILY LIVING (ADL)
ADLS_ACUITY_SCORE: 18
DEPENDENT_IADLS:: INDEPENDENT
ADLS_ACUITY_SCORE: 18

## 2024-10-18 NOTE — PLAN OF CARE
"Pt VSS. Postpartum checks WDL. C/s incision is CDI. Tolerating regular diet and able to ambulate independently. Urine output adequate, voids without difficulty. Pt utilizing tylenol and oxycodone for pain management. Pumping for  in NICU, visiting often.    Problem: Adult Inpatient Plan of Care  Goal: Plan of Care Review  Description: The Plan of Care Review/Shift note should be completed every shift.  The Outcome Evaluation is a brief statement about your assessment that the patient is improving, declining, or no change.  This information will be displayed automatically on your shift  note.  Outcome: Progressing  Flowsheets (Taken 10/18/2024 0631)  Plan of Care Reviewed With: patient  Overall Patient Progress: improving  Goal: Patient-Specific Goal (Individualized)  Description: You can add care plan individualizations to a care plan. Examples of Individualization might be:  \"Parent requests to be called daily at 9am for status\", \"I have a hard time hearing out of my right ear\", or \"Do not touch me to wake me up as it startles  me\".  Outcome: Progressing  Goal: Absence of Hospital-Acquired Illness or Injury  Outcome: Progressing  Intervention: Prevent Skin Injury  Recent Flowsheet Documentation  Taken 10/17/2024 234 by Jody Yoon RN  Body Position: position changed independently  Taken 10/17/2024 2011 by Jody Yoon RN  Body Position: position changed independently  Intervention: Prevent Infection  Recent Flowsheet Documentation  Taken 10/17/2024 2345 by Jody Yoon RN  Infection Prevention:   hand hygiene promoted   rest/sleep promoted  Taken 10/17/2024 2011 by Jody Yoon RN  Infection Prevention:   hand hygiene promoted   rest/sleep promoted  Goal: Optimal Comfort and Wellbeing  Outcome: Progressing  Intervention: Provide Person-Centered Care  Recent Flowsheet Documentation  Taken 10/17/2024 234 by Jody Yoon, RN  Trust Relationship/Rapport:   care explained   choices " provided   questions answered   questions encouraged   thoughts/feelings acknowledged  Taken 10/17/2024 2011 by Jody oYon, RN  Trust Relationship/Rapport:   care explained   choices provided   questions answered   questions encouraged   thoughts/feelings acknowledged  Goal: Readiness for Transition of Care  Outcome: Progressing     Problem: Postpartum ( Delivery)  Goal: Successful Parent Role Transition  Outcome: Progressing  Goal: Hemostasis  Outcome: Progressing  Goal: Effective Bowel Elimination  Outcome: Progressing  Goal: Fluid and Electrolyte Balance  Outcome: Progressing  Goal: Absence of Infection Signs and Symptoms  Outcome: Progressing  Goal: Anesthesia/Sedation Recovery  Outcome: Progressing  Goal: Optimal Pain Control and Function  Outcome: Progressing  Goal: Nausea and Vomiting Relief  Outcome: Progressing  Goal: Effective Urinary Elimination  Outcome: Progressing  Goal: Effective Oxygenation and Ventilation  Outcome: Progressing  Intervention: Optimize Oxygenation and Ventilation  Recent Flowsheet Documentation  Taken 10/17/2024 2345 by Jody Yoon, RN  Head of Bed (HOB) Positioning: HOB at 60-90 degrees  Taken 10/17/2024 2011 by Jody Yoon, RN  Head of Bed (HOB) Positioning: HOB at 60-90 degrees
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
## 2024-10-18 NOTE — PLAN OF CARE
"Data: Vital signs within normal limits. Postpartum checks within normal limits - see flow record. Patient eating and drinking normally. Patient able to empty bladder independently and is up ambulating. No apparent signs of infection. Incision healing well. Patient performing self cares and is able to care for infant.  Action: Patient medicated during the shift for incisional pain. See MAR. Patient education done and discharge paperwork reviewed. Questions/concerns addressed. See flow record.  Response: Patient down to NICU to visit infant today. Significant other at bedside.   Plan: Patient to discharge home today.       Problem: Adult Inpatient Plan of Care  Goal: Plan of Care Review  Description: The Plan of Care Review/Shift note should be completed every shift.  The Outcome Evaluation is a brief statement about your assessment that the patient is improving, declining, or no change.  This information will be displayed automatically on your shift  note.  Outcome: Met  Goal: Patient-Specific Goal (Individualized)  Description: You can add care plan individualizations to a care plan. Examples of Individualization might be:  \"Parent requests to be called daily at 9am for status\", \"I have a hard time hearing out of my right ear\", or \"Do not touch me to wake me up as it startles  me\".  Outcome: Met  Goal: Absence of Hospital-Acquired Illness or Injury  Outcome: Met  Intervention: Prevent Skin Injury  Recent Flowsheet Documentation  Taken 10/18/2024 0830 by Amanda Chatterjee RN  Body Position: position changed independently  Goal: Optimal Comfort and Wellbeing  Outcome: Met  Intervention: Monitor Pain and Promote Comfort  Recent Flowsheet Documentation  Taken 10/18/2024 0830 by Amanda Chatterjee RN  Pain Management Interventions: medication (see MAR)  Intervention: Provide Person-Centered Care  Recent Flowsheet Documentation  Taken 10/18/2024 0830 by Amanda Chatterjee RN  Trust Relationship/Rapport:   care explained   " choices provided   questions encouraged   questions answered  Goal: Readiness for Transition of Care  Recent Flowsheet Documentation  Taken 10/18/2024 1039 by Amanda Chatterjee RN  Anticipated Changes Related to Illness: none  Concerns to be Addressed: all concerns addressed in this encounter  10/18/2024 1036 by Amanda Chatterjee RN  Outcome: Met  Flowsheets (Taken 10/18/2024 1036)  Concerns to be Addressed: all concerns addressed in this encounter  Intervention: Mutually Develop Transition Plan  Recent Flowsheet Documentation  Taken 10/18/2024 1039 by Amanda Chatterjee RN  Anticipated Changes Related to Illness: none  Concerns to be Addressed: all concerns addressed in this encounter  Taken 10/18/2024 1036 by Amanda Chatterjee RN  Concerns to be Addressed: all concerns addressed in this encounter  Patient/Family Anticipates Transition to: home  Equipment Currently Used at Home: none     Problem: Postpartum ( Delivery)  Goal: Successful Parent Role Transition  Outcome: Met  Goal: Hemostasis  Outcome: Met  Goal: Effective Bowel Elimination  Outcome: Met  Goal: Fluid and Electrolyte Balance  Outcome: Met  Goal: Absence of Infection Signs and Symptoms  Outcome: Met  Goal: Anesthesia/Sedation Recovery  Outcome: Met  Goal: Optimal Pain Control and Function  Outcome: Met  Intervention: Prevent or Manage Pain  Recent Flowsheet Documentation  Taken 10/18/2024 0830 by Amanda Chatterjee RN  Pain Management Interventions: medication (see MAR)  Goal: Nausea and Vomiting Relief  Outcome: Met  Goal: Effective Urinary Elimination  Outcome: Met  Goal: Effective Oxygenation and Ventilation  Outcome: Met  Intervention: Optimize Oxygenation and Ventilation  Recent Flowsheet Documentation  Taken 10/18/2024 0830 by Amanda Chatterjee RN  Head of Bed (HOB) Positioning: HOB at 45 degrees

## 2024-10-18 NOTE — PLAN OF CARE
Vss and fundal checks WNL. Pain managed will with tylenol, oxycodone, and lidocaine patches per pt. Pt was in the NICU with baby majority of the day. Pumping and bring milk to NICU. Eating, drinking, and voiding without issue.     Problem: Adult Inpatient Plan of Care  Goal: Absence of Hospital-Acquired Illness or Injury  Intervention: Prevent Skin Injury  Recent Flowsheet Documentation  Taken 10/17/2024 0737 by Rafaela Mcfadden RN  Body Position: position changed independently  Goal: Optimal Comfort and Wellbeing  Intervention: Monitor Pain and Promote Comfort  Recent Flowsheet Documentation  Taken 10/17/2024 122 by Rafaela Mcfadden RN  Pain Management Interventions: medication (see MAR)  Taken 10/17/2024 0737 by Rafaela Mcfadden RN  Pain Management Interventions: (received oxycodone recently) --  Intervention: Provide Person-Centered Care  Recent Flowsheet Documentation  Taken 10/17/2024 0737 by Rafaela Mcfadden RN  Trust Relationship/Rapport:   care explained   choices provided   questions answered   questions encouraged   thoughts/feelings acknowledged     Problem: Postpartum ( Delivery)  Goal: Optimal Pain Control and Function  Intervention: Prevent or Manage Pain  Recent Flowsheet Documentation  Taken 10/17/2024 1224 by Rafaela Mcfadden RN  Pain Management Interventions: medication (see MAR)  Taken 10/17/2024 0737 by Rafaela Mcfadden RN  Pain Management Interventions: (received oxycodone recently) --
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
## 2024-10-19 ENCOUNTER — LACTATION ENCOUNTER (OUTPATIENT)
Dept: OTHER | Facility: CLINIC | Age: 24
End: 2024-10-19

## 2024-10-20 ENCOUNTER — PATIENT OUTREACH (OUTPATIENT)
Dept: CARE COORDINATION | Facility: CLINIC | Age: 24
End: 2024-10-20
Payer: COMMERCIAL

## 2024-10-20 NOTE — PROGRESS NOTES
Clinic Care Coordination Contact  [facility]/Voicemail    Clinical Data: Care Coordinator Outreach    Outreach Documentation Number of Outreach Attempt   10/20/2024   9:40 AM 2       Left message on patient's voicemail with call back information and requested return call.    Care Coordinator will do no further outreaches at this time.    Emilie Martinez  [phone]  Care

## 2024-10-30 ENCOUNTER — LACTATION ENCOUNTER (OUTPATIENT)
Dept: OTHER | Facility: CLINIC | Age: 24
End: 2024-10-30

## 2024-10-30 NOTE — LACTATION NOTE
This note was copied from a baby's chart.  Lactation visit per Beverly's request- states she has had a significant decrease in milk supply. With further discussion and clarification Beverly reports she has decreased her pump sessions and is sleeping through night- which she started about 3 days ago. Beverly is estimating that she has been pumping x2 daily last three days. Education provided on supply and demand. Reviewed importance of frequent milk removal to keep prolactin levels high. Beverly does report she wants to increase supply again- encouraged to pump q3 hours- discussed may take a 4 hour stretch at night as needed.   Beverly also reports that she was sized for smaller flange sizes but she has not purchased yet. Discussed that may be impactful as well but to focus on the q3 hour pump sessions and purchase smaller flanges when able to.   All questions answered. Support and encouragement provided.

## 2025-06-08 ENCOUNTER — HEALTH MAINTENANCE LETTER (OUTPATIENT)
Age: 25
End: 2025-06-08

## (undated) DEVICE — Device

## (undated) DEVICE — LINEN TOWEL PACK X10 5473

## (undated) DEVICE — LINEN FULL SHEET 5511

## (undated) DEVICE — SU MONOCRYL 3-0 CT-1

## (undated) DEVICE — SOL NACL 0.9% IRRIG 1000ML BOTTLE 2F7124

## (undated) DEVICE — GLOVE BIOGEL PI MICRO INDICATOR UNDERGLOVE SZ 7.0 48970

## (undated) DEVICE — SOL WATER IRRIG 1000ML BOTTLE 2F7114

## (undated) DEVICE — SU VICRYL 0 CT-1 36" J346H

## (undated) DEVICE — GLOVE BIOGEL PI MICRO INDICATOR UNDERGLOVE SZ 6.5 48965

## (undated) DEVICE — PREP CHLORAPREP 26ML TINTED ORANGE  260815

## (undated) DEVICE — LINEN BABY BLANKET 5434

## (undated) DEVICE — GLOVE BIOGEL PI MICRO SZ 7.0 48570

## (undated) DEVICE — GLOVE PROTEXIS W/NEU-THERA 6.5  2D73TE65

## (undated) DEVICE — BLADE KNIFE SURG 10 371110

## (undated) DEVICE — PACK C-SECTION LF PL15OTA83B

## (undated) DEVICE — LINEN HALF SHEET 5512

## (undated) DEVICE — BAG CLEAR TRASH 1.3M 39X33" P4040C

## (undated) DEVICE — STOCKING SLEEVE VASOPRESS COMPRESSION CALF MED VP501M

## (undated) DEVICE — SU MONOCRYL 3-0 KS 27" UND Y523H

## (undated) DEVICE — ESU GROUND PAD ADULT W/CORD E7507

## (undated) DEVICE — RETR VISCERA FISH MED 3204

## (undated) DEVICE — CATH TRAY FOLEY 16FR SILICONE 907416

## (undated) DEVICE — GLOVE BIOGEL PI MICRO SZ 6.5 48565

## (undated) DEVICE — TRANSFER DEVICE BLOOD NDL HOLDER 364880

## (undated) DEVICE — CAP BABY PINK/BLUE IC-2

## (undated) RX ORDER — FENTANYL CITRATE-0.9 % NACL/PF 10 MCG/ML
PLASTIC BAG, INJECTION (ML) INTRAVENOUS
Status: DISPENSED
Start: 2024-10-15

## (undated) RX ORDER — OXYTOCIN/0.9 % SODIUM CHLORIDE 30/500 ML
PLASTIC BAG, INJECTION (ML) INTRAVENOUS
Status: DISPENSED
Start: 2024-10-15

## (undated) RX ORDER — MORPHINE SULFATE 1 MG/ML
INJECTION, SOLUTION EPIDURAL; INTRATHECAL; INTRAVENOUS
Status: DISPENSED
Start: 2024-10-15

## (undated) RX ORDER — FENTANYL CITRATE 50 UG/ML
INJECTION, SOLUTION INTRAMUSCULAR; INTRAVENOUS
Status: DISPENSED
Start: 2024-10-15